# Patient Record
Sex: FEMALE | Race: WHITE | NOT HISPANIC OR LATINO | URBAN - METROPOLITAN AREA
[De-identification: names, ages, dates, MRNs, and addresses within clinical notes are randomized per-mention and may not be internally consistent; named-entity substitution may affect disease eponyms.]

---

## 2018-08-18 ENCOUNTER — INPATIENT (INPATIENT)
Facility: HOSPITAL | Age: 23
LOS: 2 days | Discharge: HOME | DRG: 493 | End: 2018-08-21
Attending: ORTHOPAEDIC SURGERY | Admitting: ORTHOPAEDIC SURGERY
Payer: COMMERCIAL

## 2018-08-18 ENCOUNTER — EMERGENCY (EMERGENCY)
Facility: HOSPITAL | Age: 23
LOS: 1 days | Discharge: LEFT BEFORE DISCHARGE | End: 2018-08-18
Admitting: EMERGENCY MEDICINE

## 2018-08-18 VITALS
DIASTOLIC BLOOD PRESSURE: 76 MMHG | HEART RATE: 100 BPM | SYSTOLIC BLOOD PRESSURE: 119 MMHG | TEMPERATURE: 98 F | OXYGEN SATURATION: 100 %

## 2018-08-18 VITALS
DIASTOLIC BLOOD PRESSURE: 73 MMHG | HEART RATE: 103 BPM | OXYGEN SATURATION: 100 % | SYSTOLIC BLOOD PRESSURE: 108 MMHG | RESPIRATION RATE: 18 BRPM | TEMPERATURE: 98 F

## 2018-08-18 DIAGNOSIS — S42.491D OTHER DISPLACED FRACTURE OF LOWER END OF RIGHT HUMERUS, SUBSEQUENT ENCOUNTER FOR FRACTURE WITH ROUTINE HEALING: ICD-10-CM

## 2018-08-18 LAB
ALBUMIN SERPL ELPH-MCNC: 4.2 G/DL — SIGNIFICANT CHANGE UP (ref 3.3–5)
ALP SERPL-CCNC: 42 U/L — SIGNIFICANT CHANGE UP (ref 40–120)
ALT FLD-CCNC: 8 U/L — LOW (ref 10–45)
ANION GAP SERPL CALC-SCNC: 12 MMOL/L — SIGNIFICANT CHANGE UP (ref 5–17)
APTT BLD: 29 SEC — SIGNIFICANT CHANGE UP (ref 27.5–37.4)
AST SERPL-CCNC: 12 U/L — SIGNIFICANT CHANGE UP (ref 10–40)
BASOPHILS # BLD AUTO: 0.1 K/UL — SIGNIFICANT CHANGE UP (ref 0–0.2)
BASOPHILS NFR BLD AUTO: 0.6 % — SIGNIFICANT CHANGE UP (ref 0–2)
BILIRUB SERPL-MCNC: 0.7 MG/DL — SIGNIFICANT CHANGE UP (ref 0.2–1.2)
BLD GP AB SCN SERPL QL: NEGATIVE — SIGNIFICANT CHANGE UP
BUN SERPL-MCNC: 15 MG/DL — SIGNIFICANT CHANGE UP (ref 7–23)
CALCIUM SERPL-MCNC: 9 MG/DL — SIGNIFICANT CHANGE UP (ref 8.4–10.5)
CHLORIDE SERPL-SCNC: 104 MMOL/L — SIGNIFICANT CHANGE UP (ref 96–108)
CO2 SERPL-SCNC: 25 MMOL/L — SIGNIFICANT CHANGE UP (ref 22–31)
CREAT SERPL-MCNC: 0.68 MG/DL — SIGNIFICANT CHANGE UP (ref 0.5–1.3)
EOSINOPHIL # BLD AUTO: 0.3 K/UL — SIGNIFICANT CHANGE UP (ref 0–0.5)
EOSINOPHIL NFR BLD AUTO: 3.5 % — SIGNIFICANT CHANGE UP (ref 0–6)
GLUCOSE SERPL-MCNC: 105 MG/DL — HIGH (ref 70–99)
HCG SERPL-ACNC: <2 MIU/ML — SIGNIFICANT CHANGE UP
HCT VFR BLD CALC: 32 % — LOW (ref 34.5–45)
HGB BLD-MCNC: 10.8 G/DL — LOW (ref 11.5–15.5)
INR BLD: 1 RATIO — SIGNIFICANT CHANGE UP (ref 0.88–1.16)
LYMPHOCYTES # BLD AUTO: 2.6 K/UL — SIGNIFICANT CHANGE UP (ref 1–3.3)
LYMPHOCYTES # BLD AUTO: 30.5 % — SIGNIFICANT CHANGE UP (ref 13–44)
MCHC RBC-ENTMCNC: 30.4 PG — SIGNIFICANT CHANGE UP (ref 27–34)
MCHC RBC-ENTMCNC: 33.6 GM/DL — SIGNIFICANT CHANGE UP (ref 32–36)
MCV RBC AUTO: 90.5 FL — SIGNIFICANT CHANGE UP (ref 80–100)
MONOCYTES # BLD AUTO: 0.6 K/UL — SIGNIFICANT CHANGE UP (ref 0–0.9)
MONOCYTES NFR BLD AUTO: 7.7 % — SIGNIFICANT CHANGE UP (ref 2–14)
NEUTROPHILS # BLD AUTO: 4.8 K/UL — SIGNIFICANT CHANGE UP (ref 1.8–7.4)
NEUTROPHILS NFR BLD AUTO: 57.7 % — SIGNIFICANT CHANGE UP (ref 43–77)
PLATELET # BLD AUTO: 275 K/UL — SIGNIFICANT CHANGE UP (ref 150–400)
POTASSIUM SERPL-MCNC: 3.9 MMOL/L — SIGNIFICANT CHANGE UP (ref 3.5–5.3)
POTASSIUM SERPL-SCNC: 3.9 MMOL/L — SIGNIFICANT CHANGE UP (ref 3.5–5.3)
PROT SERPL-MCNC: 6.9 G/DL — SIGNIFICANT CHANGE UP (ref 6–8.3)
PROTHROM AB SERPL-ACNC: 10.9 SEC — SIGNIFICANT CHANGE UP (ref 9.8–12.7)
RBC # BLD: 3.54 M/UL — LOW (ref 3.8–5.2)
RBC # FLD: 13 % — SIGNIFICANT CHANGE UP (ref 10.3–14.5)
RH IG SCN BLD-IMP: NEGATIVE — SIGNIFICANT CHANGE UP
RH IG SCN BLD-IMP: NEGATIVE — SIGNIFICANT CHANGE UP
SODIUM SERPL-SCNC: 141 MMOL/L — SIGNIFICANT CHANGE UP (ref 135–145)
WBC # BLD: 8.4 K/UL — SIGNIFICANT CHANGE UP (ref 3.8–10.5)
WBC # FLD AUTO: 8.4 K/UL — SIGNIFICANT CHANGE UP (ref 3.8–10.5)

## 2018-08-18 PROCEDURE — 99284 EMERGENCY DEPT VISIT MOD MDM: CPT

## 2018-08-18 RX ORDER — ACETAMINOPHEN 500 MG
650 TABLET ORAL EVERY 6 HOURS
Qty: 0 | Refills: 0 | Status: DISCONTINUED | OUTPATIENT
Start: 2018-08-18 | End: 2018-08-20

## 2018-08-18 RX ORDER — FENTANYL CITRATE 50 UG/ML
70 INJECTION INTRAVENOUS ONCE
Qty: 0 | Refills: 0 | Status: DISCONTINUED | OUTPATIENT
Start: 2018-08-18 | End: 2018-08-18

## 2018-08-18 RX ORDER — OXYCODONE HYDROCHLORIDE 5 MG/1
5 TABLET ORAL EVERY 4 HOURS
Qty: 0 | Refills: 0 | Status: DISCONTINUED | OUTPATIENT
Start: 2018-08-18 | End: 2018-08-20

## 2018-08-18 RX ORDER — OXYCODONE HYDROCHLORIDE 5 MG/1
10 TABLET ORAL EVERY 4 HOURS
Qty: 0 | Refills: 0 | Status: DISCONTINUED | OUTPATIENT
Start: 2018-08-18 | End: 2018-08-20

## 2018-08-18 RX ORDER — DIPHENHYDRAMINE HCL 50 MG
25 CAPSULE ORAL AT BEDTIME
Qty: 0 | Refills: 0 | Status: DISCONTINUED | OUTPATIENT
Start: 2018-08-18 | End: 2018-08-20

## 2018-08-18 RX ORDER — MORPHINE SULFATE 50 MG/1
2 CAPSULE, EXTENDED RELEASE ORAL EVERY 4 HOURS
Qty: 0 | Refills: 0 | Status: DISCONTINUED | OUTPATIENT
Start: 2018-08-18 | End: 2018-08-20

## 2018-08-18 RX ADMIN — FENTANYL CITRATE 70 MICROGRAM(S): 50 INJECTION INTRAVENOUS at 21:47

## 2018-08-18 NOTE — ED PROVIDER NOTE - CARE PLAN
Principal Discharge DX:	Other closed displaced fracture of distal end of right humerus with routine healing, subsequent encounter  Goal:	right arm displaced, comminuted, angulated  Secondary Diagnosis:	Fracture closed of lower end of forearm, right, sequela

## 2018-08-18 NOTE — H&P ADULT - NSHPPHYSICALEXAM_GEN_ALL_CORE
Gen: NAD, AAOx3  RUE: Skin intact, significant ecchymosis to R medial arm/elbow, mild ecchymosis over distal radius, +ttp distal humerus/distal radius, no bony ttp elsewhere, +AIN/PIN/M/R/U/Msc/Ax nerves intact, SILT, cap refill brisk, warm/well perfused fingers, compartments soft/compressible    Secondary Survey: No TTP over bony prominences, SILT, palpable pulses, full/painless range of motion, compartments soft
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
## 2018-08-18 NOTE — ED PROVIDER NOTE - MEDICAL DECISION MAKING DETAILS
Ludivina Brito MD  22 yr old female with fracture of humerus displaced, angulated, comminuted and distal radial fracture; intact neurovascular on exam, intact pulses, normal capillary refill, swelling around the elbow and upperarm with echymosis; PLan: repeat xrays, ortho consult
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
## 2018-08-18 NOTE — ED ADULT NURSE REASSESSMENT NOTE - NS ED NURSE REASSESS COMMENT FT1
pt given fentanyl intranasally for sedation for changing her splint.  tolerated fine by pt with no change in VS

## 2018-08-18 NOTE — ED ADULT NURSE NOTE - NSIMPLEMENTINTERV_GEN_ALL_ED
Implemented All Universal Safety Interventions:  [place] to call system. Call bell, personal items and telephone within reach. Instruct patient to call for assistance. Room bathroom lighting operational. Non-slip footwear when patient is off stretcher. Physically safe environment: no spills, clutter or unnecessary equipment. Stretcher in lowest position, wheels locked, appropriate side rails in place.

## 2018-08-18 NOTE — ED PROVIDER NOTE - PRINCIPAL DIAGNOSIS
Other closed displaced fracture of distal end of right humerus with routine healing, subsequent encounter

## 2018-08-19 ENCOUNTER — TRANSCRIPTION ENCOUNTER (OUTPATIENT)
Age: 23
End: 2018-08-19

## 2018-08-19 LAB — HCG UR QL: NEGATIVE — SIGNIFICANT CHANGE UP

## 2018-08-19 RX ORDER — HEPARIN SODIUM 5000 [USP'U]/ML
5000 INJECTION INTRAVENOUS; SUBCUTANEOUS EVERY 8 HOURS
Qty: 0 | Refills: 0 | Status: COMPLETED | OUTPATIENT
Start: 2018-08-19 | End: 2018-08-19

## 2018-08-19 RX ORDER — SODIUM CHLORIDE 9 MG/ML
1000 INJECTION, SOLUTION INTRAVENOUS
Qty: 0 | Refills: 0 | Status: DISCONTINUED | OUTPATIENT
Start: 2018-08-19 | End: 2018-08-20

## 2018-08-19 RX ADMIN — Medication 1 TABLET(S): at 12:52

## 2018-08-19 RX ADMIN — Medication 650 MILLIGRAM(S): at 23:00

## 2018-08-19 RX ADMIN — Medication 650 MILLIGRAM(S): at 22:30

## 2018-08-19 RX ADMIN — Medication 650 MILLIGRAM(S): at 08:20

## 2018-08-19 RX ADMIN — HEPARIN SODIUM 5000 UNIT(S): 5000 INJECTION INTRAVENOUS; SUBCUTANEOUS at 15:59

## 2018-08-19 RX ADMIN — HEPARIN SODIUM 5000 UNIT(S): 5000 INJECTION INTRAVENOUS; SUBCUTANEOUS at 22:30
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
## 2018-08-19 NOTE — PROGRESS NOTE ADULT - SUBJECTIVE AND OBJECTIVE BOX
Pt S/E at bedside, no acute events overnight, pain controlled. No complaints this morning.    Vital Signs Last 24 Hrs  T(C): 36.8 (19 Aug 2018 06:02), Max: 36.8 (19 Aug 2018 06:02)  T(F): 98.2 (19 Aug 2018 06:02), Max: 98.2 (19 Aug 2018 06:02)  HR: 70 (19 Aug 2018 06:02) (70 - 120)  BP: 102/65 (19 Aug 2018 06:02) (100/61 - 114/67)  BP(mean): --  RR: 18 (19 Aug 2018 06:02) (16 - 18)  SpO2: 98% (19 Aug 2018 06:02) (98% - 100%)    Gen: NAD, AAOx3    Right Upper Extremity:  +splints, c/d/i  +AIN/PIN/M/R/U/Msc/Ax  SILT C5-T1  +Radial Pulse  Compartments soft  No calf TTP B/L

## 2018-08-20 LAB
ANION GAP SERPL CALC-SCNC: 10 MMOL/L — SIGNIFICANT CHANGE UP (ref 5–17)
APTT BLD: 31 SEC — SIGNIFICANT CHANGE UP (ref 27.5–37.4)
BUN SERPL-MCNC: 12 MG/DL — SIGNIFICANT CHANGE UP (ref 7–23)
CALCIUM SERPL-MCNC: 8.9 MG/DL — SIGNIFICANT CHANGE UP (ref 8.4–10.5)
CHLORIDE SERPL-SCNC: 105 MMOL/L — SIGNIFICANT CHANGE UP (ref 96–108)
CO2 SERPL-SCNC: 24 MMOL/L — SIGNIFICANT CHANGE UP (ref 22–31)
CREAT SERPL-MCNC: 0.66 MG/DL — SIGNIFICANT CHANGE UP (ref 0.5–1.3)
GLUCOSE SERPL-MCNC: 100 MG/DL — HIGH (ref 70–99)
HCT VFR BLD CALC: 26.9 % — LOW (ref 34.5–45)
HGB BLD-MCNC: 9.3 G/DL — LOW (ref 11.5–15.5)
INR BLD: 1.04 RATIO — SIGNIFICANT CHANGE UP (ref 0.88–1.16)
MCHC RBC-ENTMCNC: 31.1 PG — SIGNIFICANT CHANGE UP (ref 27–34)
MCHC RBC-ENTMCNC: 34.5 GM/DL — SIGNIFICANT CHANGE UP (ref 32–36)
MCV RBC AUTO: 90.1 FL — SIGNIFICANT CHANGE UP (ref 80–100)
PLATELET # BLD AUTO: 249 K/UL — SIGNIFICANT CHANGE UP (ref 150–400)
POTASSIUM SERPL-MCNC: 4 MMOL/L — SIGNIFICANT CHANGE UP (ref 3.5–5.3)
POTASSIUM SERPL-SCNC: 4 MMOL/L — SIGNIFICANT CHANGE UP (ref 3.5–5.3)
PROTHROM AB SERPL-ACNC: 11.2 SEC — SIGNIFICANT CHANGE UP (ref 9.8–12.7)
RBC # BLD: 2.99 M/UL — LOW (ref 3.8–5.2)
RBC # FLD: 12.5 % — SIGNIFICANT CHANGE UP (ref 10.3–14.5)
SODIUM SERPL-SCNC: 139 MMOL/L — SIGNIFICANT CHANGE UP (ref 135–145)
WBC # BLD: 8.3 K/UL — SIGNIFICANT CHANGE UP (ref 3.8–10.5)
WBC # FLD AUTO: 8.3 K/UL — SIGNIFICANT CHANGE UP (ref 3.8–10.5)

## 2018-08-20 PROCEDURE — 24545 OPTX HUM FX WO NTRCNDYLR XTN: CPT | Mod: RT

## 2018-08-20 PROCEDURE — 25609 OPTX DST RD XART FX/EP SEP3+: CPT | Mod: RT

## 2018-08-20 PROCEDURE — 64708 REVISE ARM/LEG NERVE: CPT | Mod: 59,RT

## 2018-08-20 RX ORDER — DOCUSATE SODIUM 100 MG
100 CAPSULE ORAL THREE TIMES A DAY
Qty: 0 | Refills: 0 | Status: DISCONTINUED | OUTPATIENT
Start: 2018-08-20 | End: 2018-08-21

## 2018-08-20 RX ORDER — CEFAZOLIN SODIUM 1 G
2000 VIAL (EA) INJECTION EVERY 8 HOURS
Qty: 0 | Refills: 0 | Status: COMPLETED | OUTPATIENT
Start: 2018-08-20 | End: 2018-08-20

## 2018-08-20 RX ORDER — ACETAMINOPHEN 500 MG
650 TABLET ORAL EVERY 6 HOURS
Qty: 0 | Refills: 0 | Status: DISCONTINUED | OUTPATIENT
Start: 2018-08-20 | End: 2018-08-21

## 2018-08-20 RX ORDER — ONDANSETRON 8 MG/1
4 TABLET, FILM COATED ORAL ONCE
Qty: 0 | Refills: 0 | Status: DISCONTINUED | OUTPATIENT
Start: 2018-08-20 | End: 2018-08-20

## 2018-08-20 RX ORDER — SODIUM CHLORIDE 9 MG/ML
1000 INJECTION, SOLUTION INTRAVENOUS
Qty: 0 | Refills: 0 | Status: DISCONTINUED | OUTPATIENT
Start: 2018-08-20 | End: 2018-08-21

## 2018-08-20 RX ORDER — OXYCODONE HYDROCHLORIDE 5 MG/1
10 TABLET ORAL EVERY 4 HOURS
Qty: 0 | Refills: 0 | Status: DISCONTINUED | OUTPATIENT
Start: 2018-08-20 | End: 2018-08-21

## 2018-08-20 RX ORDER — TRAMADOL HYDROCHLORIDE 50 MG/1
50 TABLET ORAL EVERY 6 HOURS
Qty: 0 | Refills: 0 | Status: DISCONTINUED | OUTPATIENT
Start: 2018-08-20 | End: 2018-08-21

## 2018-08-20 RX ORDER — ONDANSETRON 8 MG/1
4 TABLET, FILM COATED ORAL EVERY 6 HOURS
Qty: 0 | Refills: 0 | Status: DISCONTINUED | OUTPATIENT
Start: 2018-08-20 | End: 2018-08-21

## 2018-08-20 RX ORDER — OXYCODONE HYDROCHLORIDE 5 MG/1
5 TABLET ORAL EVERY 4 HOURS
Qty: 0 | Refills: 0 | Status: DISCONTINUED | OUTPATIENT
Start: 2018-08-20 | End: 2018-08-21

## 2018-08-20 RX ORDER — HYDROMORPHONE HYDROCHLORIDE 2 MG/ML
0.5 INJECTION INTRAMUSCULAR; INTRAVENOUS; SUBCUTANEOUS
Qty: 0 | Refills: 0 | Status: DISCONTINUED | OUTPATIENT
Start: 2018-08-20 | End: 2018-08-20

## 2018-08-20 RX ORDER — ASPIRIN/CALCIUM CARB/MAGNESIUM 324 MG
325 TABLET ORAL
Qty: 0 | Refills: 0 | Status: DISCONTINUED | OUTPATIENT
Start: 2018-08-20 | End: 2018-08-21

## 2018-08-20 RX ORDER — MAGNESIUM HYDROXIDE 400 MG/1
30 TABLET, CHEWABLE ORAL DAILY
Qty: 0 | Refills: 0 | Status: DISCONTINUED | OUTPATIENT
Start: 2018-08-20 | End: 2018-08-21

## 2018-08-20 RX ADMIN — Medication 325 MILLIGRAM(S): at 19:06

## 2018-08-20 RX ADMIN — Medication 100 MILLIGRAM(S): at 23:07

## 2018-08-20 RX ADMIN — Medication 650 MILLIGRAM(S): at 23:06

## 2018-08-20 RX ADMIN — Medication 100 MILLIGRAM(S): at 15:47

## 2018-08-20 RX ADMIN — Medication 650 MILLIGRAM(S): at 19:06

## 2018-08-20 RX ADMIN — SODIUM CHLORIDE 100 MILLILITER(S): 9 INJECTION, SOLUTION INTRAVENOUS at 13:47

## 2018-08-20 RX ADMIN — Medication 650 MILLIGRAM(S): at 19:36
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
## 2018-08-20 NOTE — PROGRESS NOTE ADULT - SUBJECTIVE AND OBJECTIVE BOX
ORTHO ATTENDING POST OP    s/p ORIF R distal radius and R distal humerus  Bulky dressing  elevation  keep dressing clean and dry until office visit  sling for comfort	  NWB RUE  ROM as tolerated RUE  f/u 2 weeks  ancef 1 g x 24h   BID for DVT ppx  encourage ROM fingers

## 2018-08-20 NOTE — PROGRESS NOTE ADULT - SUBJECTIVE AND OBJECTIVE BOX
Pt S/E at bedside, no acute events overnight, pain controlled. No complaints this morning. Plan for OR today for ORIF R distal humerus.    Vital Signs Last 24 Hrs  T(C): 36.8 (20 Aug 2018 05:55), Max: 36.8 (19 Aug 2018 16:52)  T(F): 98.2 (20 Aug 2018 05:55), Max: 98.3 (19 Aug 2018 16:52)  HR: 94 (20 Aug 2018 05:55) (83 - 97)  BP: 109/74 (20 Aug 2018 05:55) (90/60 - 109/74)  BP(mean): --  RR: 18 (20 Aug 2018 05:55) (18 - 18)  SpO2: 100% (20 Aug 2018 05:55) (99% - 100%)    Gen: NAD, AAOx3    Right Upper Extremity:  +splint c/d/i  +AIN/PIN/M/R/U/Msc/Ax  SILT all 5 fingers  Cap refill brisk  Compartments soft  No calf TTP B/L

## 2018-08-20 NOTE — BRIEF OPERATIVE NOTE - PROCEDURE
<<-----Click on this checkbox to enter Procedure Open reduction and internal fixation of fracture of right distal radius  08/20/2018    Active  AVJOB  Open reduction and internal fixation (ORIF) of fracture of distal humerus  08/20/2018    Active  AVJOB

## 2018-08-20 NOTE — CHART NOTE - NSCHARTNOTEFT_GEN_A_CORE
No complaints; Denies SOB/CP/N/V.    T(C): 36.4 (08-20-18 @ 14:37)  T(F): 97.5 (08-20-18 @ 14:37)  HR: 112 (08-20-18 @ 14:37)  BP: 93/61 (08-20-18 @ 14:37)  RR: 18 (08-20-18 @ 14:37)  SpO2: 98% (08-20-18 @ 14:37)      Exam:  Gen: A/O; NAD    RUE:   Dressing/Sling CDI  + diffuse ecchymosis and non-pitting edema  No sensation & no motor s/p nerve block    Digits WWP; Cap refill <2 secs       A/P: 22y Female s/p ORIF R distal radius and R distal humerus; Stable  -Pain control; Ice prn; Elevate  -DVT ppx; IS  -Am labs  -PT/OT eval: NWB RUE in sling; ROM as tolerated; encourage ROM digits  -Cont current tx  -DC planning    Khushi Akers PA-C  Orthopedic Surgery  [employer]agers [phone]

## 2018-08-20 NOTE — BRIEF OPERATIVE NOTE - PRE-OP DX
Distal radius fracture, right  08/20/2018    Active  Job, Jose E VERGARA  Humerus fracture  08/20/2018  Right  Active  Job, Jose E VERGARA

## 2018-08-20 NOTE — BRIEF OPERATIVE NOTE - OPERATION/FINDINGS
ORIF of right distal humeral shaft. ORIF of distal radius.  No abnormal findings.  See operative report.

## 2018-08-21 ENCOUNTER — TRANSCRIPTION ENCOUNTER (OUTPATIENT)
Age: 23
End: 2018-08-21

## 2018-08-21 VITALS
TEMPERATURE: 98 F | SYSTOLIC BLOOD PRESSURE: 109 MMHG | RESPIRATION RATE: 17 BRPM | HEART RATE: 88 BPM | DIASTOLIC BLOOD PRESSURE: 72 MMHG | OXYGEN SATURATION: 99 %

## 2018-08-21 LAB
ANION GAP SERPL CALC-SCNC: 10 MMOL/L — SIGNIFICANT CHANGE UP (ref 5–17)
BUN SERPL-MCNC: 8 MG/DL — SIGNIFICANT CHANGE UP (ref 7–23)
CALCIUM SERPL-MCNC: 8.3 MG/DL — LOW (ref 8.4–10.5)
CHLORIDE SERPL-SCNC: 105 MMOL/L — SIGNIFICANT CHANGE UP (ref 96–108)
CO2 SERPL-SCNC: 24 MMOL/L — SIGNIFICANT CHANGE UP (ref 22–31)
CREAT SERPL-MCNC: 0.63 MG/DL — SIGNIFICANT CHANGE UP (ref 0.5–1.3)
GLUCOSE SERPL-MCNC: 109 MG/DL — HIGH (ref 70–99)
HCT VFR BLD CALC: 26.2 % — LOW (ref 34.5–45)
HGB BLD-MCNC: 8.6 G/DL — LOW (ref 11.5–15.5)
MCHC RBC-ENTMCNC: 29.5 PG — SIGNIFICANT CHANGE UP (ref 27–34)
MCHC RBC-ENTMCNC: 32.7 GM/DL — SIGNIFICANT CHANGE UP (ref 32–36)
MCV RBC AUTO: 90.4 FL — SIGNIFICANT CHANGE UP (ref 80–100)
PLATELET # BLD AUTO: 264 K/UL — SIGNIFICANT CHANGE UP (ref 150–400)
POTASSIUM SERPL-MCNC: 3.9 MMOL/L — SIGNIFICANT CHANGE UP (ref 3.5–5.3)
POTASSIUM SERPL-SCNC: 3.9 MMOL/L — SIGNIFICANT CHANGE UP (ref 3.5–5.3)
RBC # BLD: 2.9 M/UL — LOW (ref 3.8–5.2)
RBC # FLD: 12.6 % — SIGNIFICANT CHANGE UP (ref 10.3–14.5)
SODIUM SERPL-SCNC: 139 MMOL/L — SIGNIFICANT CHANGE UP (ref 135–145)
WBC # BLD: 12.8 K/UL — HIGH (ref 3.8–10.5)
WBC # FLD AUTO: 12.8 K/UL — HIGH (ref 3.8–10.5)

## 2018-08-21 PROCEDURE — 73080 X-RAY EXAM OF ELBOW: CPT

## 2018-08-21 PROCEDURE — 85730 THROMBOPLASTIN TIME PARTIAL: CPT

## 2018-08-21 PROCEDURE — 86900 BLOOD TYPING SEROLOGIC ABO: CPT

## 2018-08-21 PROCEDURE — 80048 BASIC METABOLIC PNL TOTAL CA: CPT

## 2018-08-21 PROCEDURE — 86901 BLOOD TYPING SEROLOGIC RH(D): CPT

## 2018-08-21 PROCEDURE — 86850 RBC ANTIBODY SCREEN: CPT

## 2018-08-21 PROCEDURE — 99285 EMERGENCY DEPT VISIT HI MDM: CPT

## 2018-08-21 PROCEDURE — 76000 FLUOROSCOPY <1 HR PHYS/QHP: CPT

## 2018-08-21 PROCEDURE — 85610 PROTHROMBIN TIME: CPT

## 2018-08-21 PROCEDURE — C1713: CPT

## 2018-08-21 PROCEDURE — 73090 X-RAY EXAM OF FOREARM: CPT

## 2018-08-21 PROCEDURE — C1889: CPT

## 2018-08-21 PROCEDURE — 85027 COMPLETE CBC AUTOMATED: CPT

## 2018-08-21 PROCEDURE — 97165 OT EVAL LOW COMPLEX 30 MIN: CPT

## 2018-08-21 PROCEDURE — 80053 COMPREHEN METABOLIC PANEL: CPT

## 2018-08-21 PROCEDURE — 84702 CHORIONIC GONADOTROPIN TEST: CPT

## 2018-08-21 PROCEDURE — 81025 URINE PREGNANCY TEST: CPT

## 2018-08-21 PROCEDURE — 73060 X-RAY EXAM OF HUMERUS: CPT

## 2018-08-21 RX ORDER — ASPIRIN/CALCIUM CARB/MAGNESIUM 324 MG
1 TABLET ORAL
Qty: 14 | Refills: 0 | OUTPATIENT
Start: 2018-08-21 | End: 2018-09-03

## 2018-08-21 RX ADMIN — OXYCODONE HYDROCHLORIDE 5 MILLIGRAM(S): 5 TABLET ORAL at 00:46

## 2018-08-21 RX ADMIN — OXYCODONE HYDROCHLORIDE 5 MILLIGRAM(S): 5 TABLET ORAL at 00:16

## 2018-08-21 RX ADMIN — Medication 325 MILLIGRAM(S): at 05:21

## 2018-08-21 RX ADMIN — Medication 650 MILLIGRAM(S): at 05:21

## 2018-08-21 RX ADMIN — Medication 650 MILLIGRAM(S): at 18:09

## 2018-08-21 RX ADMIN — Medication 650 MILLIGRAM(S): at 10:42

## 2018-08-21 NOTE — DISCHARGE NOTE ADULT - MEDICATION SUMMARY - MEDICATIONS TO TAKE
I will START or STAY ON the medications listed below when I get home from the hospital:    aspirin 325 mg oral tablet  -- 1 tab(s) by mouth once a day for DVT prophylaxis  -- Take with food or milk.    -- Indication: For dvt prophylaxis    oxyCODONE-acetaminophen 5 mg-325 mg oral tablet  -- 1 tab(s) by mouth every 4 hours, As Needed for pain MDD:6  -- Caution federal law prohibits the transfer of this drug to any person other  than the person for whom it was prescribed.  May cause drowsiness.  Alcohol may intensify this effect.  Use care when operating dangerous machinery.  This prescription cannot be refilled.  This product contains acetaminophen.  Do not use  with any other product containing acetaminophen to prevent possible liver damage.  Using more of this medication than prescribed may cause serious breathing problems.    -- Indication: For pain
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
## 2018-08-21 NOTE — DISCHARGE NOTE ADULT - CARE PROVIDER_API CALL
Carrington Curtis (MD), Orthopaedic Surgery  [address] [place] [address]  Phone: [phone]  Fax: [phone]

## 2018-08-21 NOTE — DISCHARGE NOTE ADULT - PATIENT PORTAL LINK FT
You can access the [facility] Patient Portal, offered by [facility], by registering with the following website: [email or web address]

## 2018-08-21 NOTE — PHYSICAL THERAPY INITIAL EVALUATION ADULT - ACTIVE RANGE OF MOTION EXAMINATION, REHAB EVAL
R elbow/wrist n/t d/t pain/Left LE Active ROM was WFL (within functional limits)/bilateral  lower extremity Active ROM was WFL (within functional limits)
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
## 2018-08-21 NOTE — PROGRESS NOTE ADULT - SUBJECTIVE AND OBJECTIVE BOX
Pt S/E at bedside, no acute events overnight, pain controlled. No complaints this morning. Would like to go home today.    Vital Signs Last 24 Hrs  T(C): 36.9 (21 Aug 2018 05:09), Max: 37.1 (20 Aug 2018 17:37)  T(F): 98.5 (21 Aug 2018 05:09), Max: 98.8 (21 Aug 2018 00:37)  HR: 75 (21 Aug 2018 05:09) (55 - 112)  BP: 107/67 (21 Aug 2018 05:09) (93/58 - 107/69)  BP(mean): 67 (20 Aug 2018 13:45) (67 - 75)  RR: 18 (21 Aug 2018 05:09) (8 - 18)  SpO2: 94% (21 Aug 2018 05:09) (94% - 100%)    Gen: NAD, AAOx3    Right Upper Extremity:  Dressing clean dry intact, +swelling of R hand  +AIN/PIN/M/R/U/Msc/Ax  SILT C5-T1, still slightly diminished 2/2 block  +Radial Pulse  Compartments soft  No calf TTP B/L

## 2018-08-21 NOTE — OCCUPATIONAL THERAPY INITIAL EVALUATION ADULT - ANTICIPATED DISCHARGE DISPOSITION, OT EVAL
Home with outpatient OT when cleared by MD. Home with supervision/assist for all functional activities and ADLs as needed

## 2018-08-21 NOTE — DISCHARGE NOTE ADULT - HOSPITAL COURSE
The patient is a 22y year old Female status post Open Reduction Surgical Fixation of a right distal humerus and distal radius after being admitted through [facility] ED. The patient was medically optimized for the previously mentioned surgical procedure. The patient was taken to the operating room on date mentioned above. Prophylactic antibiotics were started before the procedure and continued for 24 hours. There were no complications during the procedure and patient tolerated the procedure well. The patient was transferred to recovery room in stable condition and subsequently to surgical floor. Patient was placed on aspirin 325mg daily for anticoagulation. All home medications were continued. The patient received physical therapy while in the hospital. The dressing was kept clean, dry and intact. The rest of the hospital stay was unremarkable. The patient was discharged in stable condition to follow up as outpatient.

## 2018-08-21 NOTE — OCCUPATIONAL THERAPY INITIAL EVALUATION ADULT - RANGE OF MOTION, PT EVAL
GOAL: Pt's AROM of RUE WFL in 4 weeks to increase ability to participate in functional mobility and ADLs.

## 2018-08-21 NOTE — OCCUPATIONAL THERAPY INITIAL EVALUATION ADULT - RANGE OF MOTION EXAMINATION, UPPER EXTREMITY
R digits limited by swelling and pain, R shoulder/elbow/wrist NT awaiting clarification of orders/Left UE Active ROM was WFL (within functional limits)

## 2018-08-21 NOTE — DISCHARGE NOTE ADULT - PLAN OF CARE
Return to baseline ADLs 1. Pain Control  2. Non-Weight Bearing Right Upper Extremity in sling  3. DVT Prophylaxis for 14 days with aspirin 325mg daily  4. Physical Therapy  5. Follow up with Dr. Curtis as outpatient in 10-14 days after discharge from the hospital or rehab. Call office for appointment.  6. Staples/sutures to be removed Post-Op Day 14, and repeat x-rays as needed.  7. Ice/Elevate affected area as needed.  8. Keep dressing clean and dry 1. Pain Control  2. Non-Weight Bearing Right Upper Extremity in sling  3. DVT Prophylaxis for 14 days with aspirin 325mg daily  4. Physical Therapy  5. Follow up with Dr. Curtis as outpatient in 10-14 days after discharge from the hospital or rehab. Call office for appointment.  6. Staples/sutures to be removed Post-Op Day 14, and repeat x-rays as needed.  7. Ice/Elevate affected area as needed.  8. Keep dressing clean and dry    Take Percocet for severe pain only. Can take up to 8 Tylenol 325 mg a day while taking Percocet. Alternate between taking Ibuprofen and Tylenol so you are taking pain medication every 3-4 hours if your pain is severe.  Narcotic pain medicine can cause extreme nausea and constipation. Drink plenty of water and take diuretics (colace, Miralax) as needed. You can get them from your local pharmacy.  It is important to ice and elevate your arm to keep swelling down and the pain manageable. Keep the ice on for 20 minutes, and then keep off for 20 minutes. Repeat while awake.

## 2018-08-21 NOTE — DISCHARGE NOTE ADULT - CARE PLAN
Principal Discharge DX:	Other closed displaced fracture of distal end of right humerus with routine healing, subsequent encounter  Goal:	Return to baseline ADLs  Assessment and plan of treatment:	1. Pain Control  2. Non-Weight Bearing Right Upper Extremity in sling  3. DVT Prophylaxis for 14 days with aspirin 325mg daily  4. Physical Therapy  5. Follow up with Dr. Curtis as outpatient in 10-14 days after discharge from the hospital or rehab. Call office for appointment.  6. Staples/sutures to be removed Post-Op Day 14, and repeat x-rays as needed.  7. Ice/Elevate affected area as needed.  8. Keep dressing clean and dry Principal Discharge DX:	Other closed displaced fracture of distal end of right humerus with routine healing, subsequent encounter  Goal:	Return to baseline ADLs  Assessment and plan of treatment:	1. Pain Control  2. Non-Weight Bearing Right Upper Extremity in sling  3. DVT Prophylaxis for 14 days with aspirin 325mg daily  4. Physical Therapy  5. Follow up with Dr. Curtis as outpatient in 10-14 days after discharge from the hospital or rehab. Call office for appointment.  6. Staples/sutures to be removed Post-Op Day 14, and repeat x-rays as needed.  7. Ice/Elevate affected area as needed.  8. Keep dressing clean and dry    Take Percocet for severe pain only. Can take up to 8 Tylenol 325 mg a day while taking Percocet. Alternate between taking Ibuprofen and Tylenol so you are taking pain medication every 3-4 hours if your pain is severe.  Narcotic pain medicine can cause extreme nausea and constipation. Drink plenty of water and take diuretics (colace, Miralax) as needed. You can get them from your local pharmacy.  It is important to ice and elevate your arm to keep swelling down and the pain manageable. Keep the ice on for 20 minutes, and then keep off for 20 minutes. Repeat while awake.

## 2018-08-21 NOTE — OCCUPATIONAL THERAPY INITIAL EVALUATION ADULT - LIVES WITH, PROFILE
as per pt, resides in a PH with parents, 5 stairs to enter with railings, one flight up with railings, PTA, pt (I) with amb, (I) with ADLs.

## 2018-09-05 ENCOUNTER — APPOINTMENT (OUTPATIENT)
Dept: ORTHOPEDIC SURGERY | Facility: CLINIC | Age: 23
End: 2018-09-05
Payer: COMMERCIAL

## 2018-09-05 VITALS — DIASTOLIC BLOOD PRESSURE: 72 MMHG | HEART RATE: 91 BPM | SYSTOLIC BLOOD PRESSURE: 117 MMHG

## 2018-09-05 VITALS — HEIGHT: 66 IN | WEIGHT: 110 LBS | BODY MASS INDEX: 17.68 KG/M2

## 2018-09-05 DIAGNOSIS — Z78.9 OTHER SPECIFIED HEALTH STATUS: ICD-10-CM

## 2018-09-05 PROCEDURE — 99024 POSTOP FOLLOW-UP VISIT: CPT

## 2018-09-18 ENCOUNTER — APPOINTMENT (OUTPATIENT)
Dept: ORTHOPEDIC SURGERY | Facility: CLINIC | Age: 23
End: 2018-09-18
Payer: COMMERCIAL

## 2018-09-18 VITALS
HEART RATE: 80 BPM | BODY MASS INDEX: 18 KG/M2 | HEIGHT: 66 IN | DIASTOLIC BLOOD PRESSURE: 70 MMHG | SYSTOLIC BLOOD PRESSURE: 105 MMHG | WEIGHT: 112 LBS

## 2018-09-18 PROCEDURE — 99024 POSTOP FOLLOW-UP VISIT: CPT

## 2018-09-18 PROCEDURE — 73080 X-RAY EXAM OF ELBOW: CPT | Mod: RT

## 2018-09-18 PROCEDURE — 73110 X-RAY EXAM OF WRIST: CPT | Mod: RT

## 2018-10-23 ENCOUNTER — APPOINTMENT (OUTPATIENT)
Dept: ORTHOPEDIC SURGERY | Facility: CLINIC | Age: 23
End: 2018-10-23
Payer: COMMERCIAL

## 2018-10-23 VITALS
DIASTOLIC BLOOD PRESSURE: 75 MMHG | WEIGHT: 115 LBS | HEIGHT: 66 IN | BODY MASS INDEX: 18.48 KG/M2 | HEART RATE: 90 BPM | SYSTOLIC BLOOD PRESSURE: 117 MMHG

## 2018-10-23 PROCEDURE — 73110 X-RAY EXAM OF WRIST: CPT | Mod: RT

## 2018-10-23 PROCEDURE — 73080 X-RAY EXAM OF ELBOW: CPT | Mod: RT

## 2018-10-23 PROCEDURE — 99024 POSTOP FOLLOW-UP VISIT: CPT

## 2018-11-21 ENCOUNTER — APPOINTMENT (OUTPATIENT)
Dept: ORTHOPEDIC SURGERY | Facility: CLINIC | Age: 23
End: 2018-11-21
Payer: COMMERCIAL

## 2018-11-21 VITALS
DIASTOLIC BLOOD PRESSURE: 64 MMHG | SYSTOLIC BLOOD PRESSURE: 102 MMHG | WEIGHT: 117 LBS | BODY MASS INDEX: 18.8 KG/M2 | HEART RATE: 87 BPM | HEIGHT: 66 IN

## 2018-11-21 PROCEDURE — 73110 X-RAY EXAM OF WRIST: CPT | Mod: RT

## 2018-11-21 PROCEDURE — 99213 OFFICE O/P EST LOW 20 MIN: CPT

## 2018-11-21 PROCEDURE — 73060 X-RAY EXAM OF HUMERUS: CPT | Mod: RT

## 2019-01-15 ENCOUNTER — TRANSCRIPTION ENCOUNTER (OUTPATIENT)
Age: 24
End: 2019-01-15

## 2019-02-20 ENCOUNTER — APPOINTMENT (OUTPATIENT)
Dept: ORTHOPEDIC SURGERY | Facility: CLINIC | Age: 24
End: 2019-02-20

## 2019-03-19 ENCOUNTER — APPOINTMENT (OUTPATIENT)
Dept: ORTHOPEDIC SURGERY | Facility: CLINIC | Age: 24
End: 2019-03-19
Payer: COMMERCIAL

## 2019-03-19 DIAGNOSIS — S42.491D OTHER DISPLACED FRACTURE OF LOWER END OF RIGHT HUMERUS, SUBSEQUENT ENCOUNTER FOR FRACTURE WITH ROUTINE HEALING: ICD-10-CM

## 2019-03-19 DIAGNOSIS — S52.571D OTHER INTRAARTICULAR FRACTURE OF LOWER END OF RIGHT RADIUS, SUBSEQUENT ENCOUNTER FOR CLOSED FRACTURE WITH ROUTINE HEALING: ICD-10-CM

## 2019-03-19 PROCEDURE — 73080 X-RAY EXAM OF ELBOW: CPT | Mod: RT

## 2019-03-19 PROCEDURE — 73110 X-RAY EXAM OF WRIST: CPT | Mod: RT

## 2019-03-19 PROCEDURE — 99213 OFFICE O/P EST LOW 20 MIN: CPT
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
## 2020-05-20 NOTE — PROGRESS NOTE ADULT - ASSESSMENT
Prior authorization submitted to cover my meds for [facility] 05/20/20.    Key: [id number]    22F s/p ORIF R distal humerus/distal radius POD 1  Analgesia  DVT ppx  NWB RUE in sling, ROM as tolerated  Encourage ROM fingers  PT/OT  DC planning to home likely today

## 2020-11-24 ENCOUNTER — APPOINTMENT (OUTPATIENT)
Dept: FAMILY MEDICINE | Facility: CLINIC | Age: 25
End: 2020-11-24
Payer: COMMERCIAL

## 2020-11-24 VITALS
OXYGEN SATURATION: 100 % | HEIGHT: 66 IN | DIASTOLIC BLOOD PRESSURE: 70 MMHG | WEIGHT: 110 LBS | SYSTOLIC BLOOD PRESSURE: 102 MMHG | BODY MASS INDEX: 17.68 KG/M2 | HEART RATE: 95 BPM | TEMPERATURE: 98.2 F

## 2020-11-24 DIAGNOSIS — Z11.59 ENCOUNTER FOR SCREENING FOR OTHER VIRAL DISEASES: ICD-10-CM

## 2020-11-24 PROCEDURE — 99385 PREV VISIT NEW AGE 18-39: CPT | Mod: 25

## 2020-11-24 PROCEDURE — 36415 COLL VENOUS BLD VENIPUNCTURE: CPT

## 2020-11-24 RX ORDER — NITROFURANTOIN (MONOHYDRATE/MACROCRYSTALS) 25; 75 MG/1; MG/1
100 CAPSULE ORAL
Qty: 10 | Refills: 0 | Status: COMPLETED | COMMUNITY
Start: 2018-08-13 | End: 2020-11-24

## 2020-11-24 RX ORDER — HYDROCODONE BITARTRATE AND ACETAMINOPHEN 5; 325 MG/1; MG/1
5-325 TABLET ORAL
Qty: 12 | Refills: 0 | Status: COMPLETED | COMMUNITY
Start: 2018-08-15 | End: 2020-11-24

## 2020-11-24 RX ORDER — IBUPROFEN 400 MG/1
400 TABLET, FILM COATED ORAL
Qty: 30 | Refills: 0 | Status: COMPLETED | COMMUNITY
Start: 2018-08-15 | End: 2020-11-24

## 2020-11-24 RX ORDER — ASPIRIN 325 MG/1
325 TABLET, FILM COATED ORAL
Qty: 14 | Refills: 0 | Status: COMPLETED | COMMUNITY
Start: 2018-08-21 | End: 2020-11-24

## 2020-11-24 NOTE — HEALTH RISK ASSESSMENT
[] : No [Yes] : Yes [Monthly or less (1 pt)] : Monthly or less (1 point) [1 or 2 (0 pts)] : 1 or 2 (0 points) [Less than monthly (1 pt)] : Less than monthly (1 point) [No] : In the past 12 months have you used drugs other than those required for medical reasons? No [Audit-CScore] : 2 [[id number]] : 3 [Patient reported PAP Smear was normal] : Patient reported PAP Smear was normal [HIV test declined] : HIV test declined [Hepatitis C test declined] : Hepatitis C test declined [Sexually Active] : sexually active [Fully functional (bathing, dressing, toileting, transferring, walking, feeding)] : Fully functional (bathing, dressing, toileting, transferring, walking, feeding) [Fully functional (using the telephone, shopping, preparing meals, housekeeping, doing laundry, using] : Fully functional and needs no help or supervision to perform IADLs (using the telephone, shopping, preparing meals, housekeeping, doing laundry, using transportation, managing medications and managing finances) [PapSmearDate] : 2019

## 2020-11-24 NOTE — HISTORY OF PRESENT ILLNESS
[de-identified] : 24 yo F with no significant PMH presents for annual. She is underweight-- trying to gain weight. She tried taking protein shakes but gets upset stomach. \par \par Diet - regular\par exercise-- active but does not exercise

## 2020-11-30 LAB
25(OH)D3 SERPL-MCNC: 42.3 NG/ML
ALBUMIN SERPL ELPH-MCNC: 5 G/DL
ALP BLD-CCNC: 45 U/L
ALT SERPL-CCNC: 5 U/L
ANION GAP SERPL CALC-SCNC: 11 MMOL/L
AST SERPL-CCNC: 11 U/L
BASOPHILS # BLD AUTO: 0.02 K/UL
BASOPHILS NFR BLD AUTO: 0.2 %
BILIRUB SERPL-MCNC: 0.2 MG/DL
BUN SERPL-MCNC: 14 MG/DL
CALCIUM SERPL-MCNC: 9.7 MG/DL
CHLORIDE SERPL-SCNC: 104 MMOL/L
CHOLEST SERPL-MCNC: 147 MG/DL
CO2 SERPL-SCNC: 23 MMOL/L
CREAT SERPL-MCNC: 0.84 MG/DL
EOSINOPHIL # BLD AUTO: 0.09 K/UL
EOSINOPHIL NFR BLD AUTO: 0.8 %
ESTIMATED AVERAGE GLUCOSE: 103 MG/DL
GLUCOSE SERPL-MCNC: 92 MG/DL
HBA1C MFR BLD HPLC: 5.2 %
HCT VFR BLD CALC: 40 %
HDLC SERPL-MCNC: 59 MG/DL
HGB BLD-MCNC: 13.4 G/DL
IMM GRANULOCYTES NFR BLD AUTO: 0.3 %
LDLC SERPL CALC-MCNC: 76 MG/DL
LYMPHOCYTES # BLD AUTO: 3.83 K/UL
LYMPHOCYTES NFR BLD AUTO: 34.6 %
MAN DIFF?: NORMAL
MCHC RBC-ENTMCNC: 30.3 PG
MCHC RBC-ENTMCNC: 33.5 GM/DL
MCV RBC AUTO: 90.5 FL
MONOCYTES # BLD AUTO: 0.97 K/UL
MONOCYTES NFR BLD AUTO: 8.8 %
NEUTROPHILS # BLD AUTO: 6.12 K/UL
NEUTROPHILS NFR BLD AUTO: 55.3 %
NONHDLC SERPL-MCNC: 88 MG/DL
PLATELET # BLD AUTO: 261 K/UL
POTASSIUM SERPL-SCNC: 4 MMOL/L
PROT SERPL-MCNC: 6.8 G/DL
RBC # BLD: 4.42 M/UL
RBC # FLD: 13.2 %
SARS-COV-2 IGG SERPL IA-ACNC: 0.1 INDEX
SARS-COV-2 IGG SERPL QL IA: NEGATIVE
SODIUM SERPL-SCNC: 138 MMOL/L
TRIGL SERPL-MCNC: 64 MG/DL
TSH SERPL-ACNC: 2.22 UIU/ML
VIT B12 SERPL-MCNC: 658 PG/ML
WBC # FLD AUTO: 11.06 K/UL

## 2022-04-11 PROBLEM — Z11.59 SCREENING FOR VIRAL DISEASE: Status: ACTIVE | Noted: 2020-11-24

## 2022-06-16 ENCOUNTER — NON-APPOINTMENT (OUTPATIENT)
Age: 27
End: 2022-06-16

## 2022-08-16 ENCOUNTER — APPOINTMENT (OUTPATIENT)
Dept: FAMILY MEDICINE | Facility: CLINIC | Age: 27
End: 2022-08-16

## 2022-08-16 ENCOUNTER — TRANSCRIPTION ENCOUNTER (OUTPATIENT)
Age: 27
End: 2022-08-16

## 2022-08-16 VITALS
DIASTOLIC BLOOD PRESSURE: 73 MMHG | WEIGHT: 112 LBS | TEMPERATURE: 98.1 F | BODY MASS INDEX: 18 KG/M2 | SYSTOLIC BLOOD PRESSURE: 113 MMHG | HEIGHT: 66 IN | OXYGEN SATURATION: 100 % | HEART RATE: 80 BPM

## 2022-08-16 DIAGNOSIS — Z11.3 ENCOUNTER FOR SCREENING FOR INFECTIONS WITH A PREDOMINANTLY SEXUAL MODE OF TRANSMISSION: ICD-10-CM

## 2022-08-16 PROCEDURE — 99395 PREV VISIT EST AGE 18-39: CPT

## 2022-08-16 NOTE — HISTORY OF PRESENT ILLNESS
[FreeTextEntry1] : annual [de-identified] : 25 yo F with no significant PMH presents for annual. No concerns at this time.\par \par sexually active, using protection. Seeing GYN later today. \par \par She will find out when her last Tdap was.

## 2022-08-17 ENCOUNTER — NON-APPOINTMENT (OUTPATIENT)
Age: 27
End: 2022-08-17

## 2022-08-17 LAB
25(OH)D3 SERPL-MCNC: 30.2 NG/ML
ABO + RH PNL BLD: NORMAL
ALBUMIN SERPL ELPH-MCNC: 4.8 G/DL
ALP BLD-CCNC: 49 U/L
ALT SERPL-CCNC: 10 U/L
ANION GAP SERPL CALC-SCNC: 11 MMOL/L
AST SERPL-CCNC: 14 U/L
BASOPHILS # BLD AUTO: 0.02 K/UL
BASOPHILS NFR BLD AUTO: 0.3 %
BILIRUB SERPL-MCNC: 0.3 MG/DL
BUN SERPL-MCNC: 11 MG/DL
C TRACH RRNA SPEC QL NAA+PROBE: NOT DETECTED
CALCIUM SERPL-MCNC: 9.6 MG/DL
CHLORIDE SERPL-SCNC: 106 MMOL/L
CHOLEST SERPL-MCNC: 163 MG/DL
CO2 SERPL-SCNC: 24 MMOL/L
CREAT SERPL-MCNC: 0.67 MG/DL
EGFR: 124 ML/MIN/1.73M2
EOSINOPHIL # BLD AUTO: 0.05 K/UL
EOSINOPHIL NFR BLD AUTO: 0.8 %
ESTIMATED AVERAGE GLUCOSE: 103 MG/DL
GLUCOSE SERPL-MCNC: 84 MG/DL
HAV IGM SER QL: NONREACTIVE
HBA1C MFR BLD HPLC: 5.2 %
HBV CORE IGM SER QL: NONREACTIVE
HBV SURFACE AG SER QL: NONREACTIVE
HCT VFR BLD CALC: 43.5 %
HCV AB SER QL: NONREACTIVE
HCV S/CO RATIO: 0.24 S/CO
HDLC SERPL-MCNC: 62 MG/DL
HGB BLD-MCNC: 13.5 G/DL
HIV1+2 AB SPEC QL IA.RAPID: NONREACTIVE
IMM GRANULOCYTES NFR BLD AUTO: 0.2 %
LDLC SERPL CALC-MCNC: 93 MG/DL
LYMPHOCYTES # BLD AUTO: 2.56 K/UL
LYMPHOCYTES NFR BLD AUTO: 42.2 %
MAN DIFF?: NORMAL
MCHC RBC-ENTMCNC: 29 PG
MCHC RBC-ENTMCNC: 31 GM/DL
MCV RBC AUTO: 93.5 FL
MONOCYTES # BLD AUTO: 0.57 K/UL
MONOCYTES NFR BLD AUTO: 9.4 %
N GONORRHOEA RRNA SPEC QL NAA+PROBE: NOT DETECTED
NEUTROPHILS # BLD AUTO: 2.85 K/UL
NEUTROPHILS NFR BLD AUTO: 47.1 %
NONHDLC SERPL-MCNC: 101 MG/DL
PLATELET # BLD AUTO: 253 K/UL
POTASSIUM SERPL-SCNC: 4.5 MMOL/L
PROT SERPL-MCNC: 7.1 G/DL
RBC # BLD: 4.65 M/UL
RBC # FLD: 14.4 %
SODIUM SERPL-SCNC: 141 MMOL/L
SOURCE AMPLIFICATION: NORMAL
T PALLIDUM AB SER QL IA: NEGATIVE
TRIGL SERPL-MCNC: 40 MG/DL
TSH SERPL-ACNC: 1.33 UIU/ML
VIT B12 SERPL-MCNC: 728 PG/ML
WBC # FLD AUTO: 6.06 K/UL

## 2022-10-05 ENCOUNTER — NON-APPOINTMENT (OUTPATIENT)
Age: 27
End: 2022-10-05
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
## 2023-10-09 ENCOUNTER — APPOINTMENT (OUTPATIENT)
Dept: INTERNAL MEDICINE | Facility: CLINIC | Age: 28
End: 2023-10-09
Payer: COMMERCIAL

## 2023-10-09 VITALS
WEIGHT: 114 LBS | BODY MASS INDEX: 18.32 KG/M2 | DIASTOLIC BLOOD PRESSURE: 84 MMHG | HEIGHT: 66 IN | OXYGEN SATURATION: 100 % | SYSTOLIC BLOOD PRESSURE: 134 MMHG | HEART RATE: 103 BPM

## 2023-10-09 VITALS — SYSTOLIC BLOOD PRESSURE: 110 MMHG | HEART RATE: 80 BPM | DIASTOLIC BLOOD PRESSURE: 70 MMHG

## 2023-10-09 DIAGNOSIS — E55.9 VITAMIN D DEFICIENCY, UNSPECIFIED: ICD-10-CM

## 2023-10-09 DIAGNOSIS — Z23 ENCOUNTER FOR IMMUNIZATION: ICD-10-CM

## 2023-10-09 DIAGNOSIS — Z01.84 ENCOUNTER FOR ANTIBODY RESPONSE EXAMINATION: ICD-10-CM

## 2023-10-09 DIAGNOSIS — Z11.1 ENCOUNTER FOR SCREENING FOR RESPIRATORY TUBERCULOSIS: ICD-10-CM

## 2023-10-09 DIAGNOSIS — Z00.00 ENCOUNTER FOR GENERAL ADULT MEDICAL EXAMINATION W/OUT ABNORMAL FINDINGS: ICD-10-CM

## 2023-10-09 PROCEDURE — 99395 PREV VISIT EST AGE 18-39: CPT | Mod: 25

## 2023-10-09 PROCEDURE — 90715 TDAP VACCINE 7 YRS/> IM: CPT

## 2023-10-09 PROCEDURE — 90686 IIV4 VACC NO PRSV 0.5 ML IM: CPT

## 2023-10-09 PROCEDURE — 90472 IMMUNIZATION ADMIN EACH ADD: CPT

## 2023-10-09 PROCEDURE — G0008: CPT

## 2023-10-09 RX ORDER — AMOXICILLIN AND CLAVULANATE POTASSIUM 875; 125 MG/1; MG/1
875-125 TABLET, COATED ORAL
Qty: 20 | Refills: 0 | Status: COMPLETED | COMMUNITY
Start: 2023-09-23

## 2023-10-11 ENCOUNTER — TRANSCRIPTION ENCOUNTER (OUTPATIENT)
Age: 28
End: 2023-10-11

## 2023-10-11 LAB
25(OH)D3 SERPL-MCNC: 30.5 NG/ML
ALBUMIN SERPL ELPH-MCNC: 5.3 G/DL
ALP BLD-CCNC: 46 U/L
ALT SERPL-CCNC: 9 U/L
ANION GAP SERPL CALC-SCNC: 15 MMOL/L
AST SERPL-CCNC: 14 U/L
BASOPHILS # BLD AUTO: 0.03 K/UL
BASOPHILS NFR BLD AUTO: 0.3 %
BILIRUB SERPL-MCNC: 0.4 MG/DL
BUN SERPL-MCNC: 8 MG/DL
CALCIUM SERPL-MCNC: 10.2 MG/DL
CHLORIDE SERPL-SCNC: 103 MMOL/L
CHOLEST SERPL-MCNC: 179 MG/DL
CO2 SERPL-SCNC: 22 MMOL/L
CREAT SERPL-MCNC: 0.63 MG/DL
EGFR: 125 ML/MIN/1.73M2
EOSINOPHIL # BLD AUTO: 0.07 K/UL
EOSINOPHIL NFR BLD AUTO: 0.6 %
ESTIMATED AVERAGE GLUCOSE: 100 MG/DL
GLUCOSE SERPL-MCNC: 85 MG/DL
HBA1C MFR BLD HPLC: 5.1 %
HBV SURFACE AB SERPL IA-ACNC: <3 MIU/ML
HCT VFR BLD CALC: 42.9 %
HDLC SERPL-MCNC: 61 MG/DL
HGB BLD-MCNC: 13.9 G/DL
IMM GRANULOCYTES NFR BLD AUTO: 0.4 %
LDLC SERPL CALC-MCNC: 104 MG/DL
LYMPHOCYTES # BLD AUTO: 3.51 K/UL
LYMPHOCYTES NFR BLD AUTO: 31.4 %
M TB IFN-G BLD-IMP: NEGATIVE
MAN DIFF?: NORMAL
MCHC RBC-ENTMCNC: 29.8 PG
MCHC RBC-ENTMCNC: 32.4 GM/DL
MCV RBC AUTO: 92.1 FL
MEV IGG FLD QL IA: 93.9 AU/ML
MEV IGG+IGM SER-IMP: POSITIVE
MONOCYTES # BLD AUTO: 0.96 K/UL
MONOCYTES NFR BLD AUTO: 8.6 %
MUV AB SER-ACNC: POSITIVE
MUV IGG SER QL IA: >300 AU/ML
NEUTROPHILS # BLD AUTO: 6.58 K/UL
NEUTROPHILS NFR BLD AUTO: 58.7 %
NONHDLC SERPL-MCNC: 118 MG/DL
PLATELET # BLD AUTO: 337 K/UL
POTASSIUM SERPL-SCNC: 3.6 MMOL/L
PROT SERPL-MCNC: 8 G/DL
QUANTIFERON TB PLUS MITOGEN MINUS NIL: 9.72 IU/ML
QUANTIFERON TB PLUS NIL: 0.08 IU/ML
QUANTIFERON TB PLUS TB1 MINUS NIL: 0.12 IU/ML
QUANTIFERON TB PLUS TB2 MINUS NIL: 0.08 IU/ML
RBC # BLD: 4.66 M/UL
RBC # FLD: 13.9 %
RUBV IGG FLD-ACNC: 5 INDEX
RUBV IGG SER-IMP: POSITIVE
SODIUM SERPL-SCNC: 139 MMOL/L
TRIGL SERPL-MCNC: 72 MG/DL
TSH SERPL-ACNC: 2.19 UIU/ML
VIT B12 SERPL-MCNC: 819 PG/ML
VZV AB TITR SER: POSITIVE
VZV IGG SER IF-ACNC: 1340 INDEX
WBC # FLD AUTO: 11.19 K/UL

## 2023-10-13 ENCOUNTER — TRANSCRIPTION ENCOUNTER (OUTPATIENT)
Age: 28
End: 2023-10-13

## 2023-12-11 NOTE — PATIENT PROFILE ADULT. - FUNCTIONAL SCREEN CURRENT LEVEL: TOILETING, MLM
Subjective   Patient ID: Yokasta Wasserman is a 17 y.o. female who presents for nurse visit due to not being sure if she needs a vaccine.    Patient has a complaint of dysmenorrhea.    Complains of chest pain when lying down. Occurs when walking home and carrying bookbag. She could be lying down and also occasionally gets a random pain on the left side of her chest.     HPI    Review of Systems   Constitutional: Negative.  Negative for activity change, appetite change, fatigue and fever.   HENT:  Positive for nosebleeds. Negative for congestion, hearing loss, mouth sores, sinus pain and sore throat.    Eyes:  Negative for pain, itching and visual disturbance.   Respiratory:  Positive for chest tightness (see HPI). Negative for cough and wheezing.    Cardiovascular:  Negative for chest pain and palpitations.   Gastrointestinal:  Positive for constipation (occasional;). Negative for abdominal pain, diarrhea, nausea and vomiting.   Endocrine: Negative for cold intolerance and heat intolerance.   Genitourinary:  Negative for difficulty urinating, dysuria, hematuria, vaginal discharge and vaginal pain.   Musculoskeletal:  Negative for arthralgias, back pain, myalgias and neck stiffness.   Skin:  Negative for rash.   Allergic/Immunologic: Negative for environmental allergies and food allergies.   Neurological:  Negative for seizures, syncope, weakness and headaches.   Psychiatric/Behavioral:  Negative for behavioral problems, dysphoric mood, hallucinations, self-injury and suicidal ideas. The patient is nervous/anxious.      Nervousness does not interfere with her life; takes just a few minutes to fall asleep at night.    Objective   Physical Exam  Constitutional:       Appearance: Normal appearance.   HENT:      Nose: Congestion present.   Cardiovascular:      Rate and Rhythm: Normal rate and regular rhythm.   Pulmonary:      Effort: Pulmonary effort is normal.      Breath sounds: Normal breath sounds.   Chest:      Comments:  +reproducible chest pain on palpation consistent with the pain she describes for the chest pain. Costochondritis.        Assessment/Plan   Problem List Items Addressed This Visit    None  Visit Diagnoses       Dysmenorrhea    -  Primary    Costochondritis        Precordial catch syndrome               Patient Instructions   Great to meet you today!  You have dysmenorrhea - learn more at [email or web address] or [email or web address]. These sites have suggestions for treatment.  For now:  Please eat three meals per day, sleep 8-9 hours each night, and exercise 30 minutes each day.   At the first hint of menstrual cramps, please take ibuprofen or naproxen as instructed WITH FOOD. Use as directed. It is important to take the medication before the pain worsens so it will work better.  Please call for follow up if this does not help with the discomfort. We have further strategies that can help with your pain.     You also have reproducible chest pain consistent with costochondritis. Use ibuprofen 600 mg every 8 hours as needed for discomfort - always take with food. The inflammation should get better using this treatment.     Follow up in a couple of months. We have other options for dysmenorrhea - some of which we discussed today. If the pain continues, please come back to see me!          Jovana Leos MD 12/11/23 3:18 PM     I spent 45 minutes with the patient counseling and completing charting. Also completed other patient history elements for the chart.    (0) independent
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
## 2024-06-06 NOTE — OCCUPATIONAL THERAPY INITIAL EVALUATION ADULT - PLANNED THERAPY INTERVENTIONS, OT EVAL
Laparoscopic Appendectomy    Patient Name: Vlad King     SURGERY DATE: 24     : 1960     AGE: 63 y.o.     Surgeon: Madhuri Batista DO    Anesthesiologist: Anesthesiologist: Adrian Valentine MD  CRNA: Micki Hernandez, APRN - CRNA; Carmen Mejias, APRN - CRNA     Anesthesia: General     Surgical Assist: Cornelia Celaya    PreOp DX: Acute Appendicitis    PostOp DX: acute on chronic appendicitis with abscess    Procedure: Laparoscopic appendectomy    Implant:   Implant Name Type Inv. Item Serial No.  Lot No. LRB No. Used Action   PATCH LAMINE M DIA2.5IN CIR W/ STRP [employer] ABSRB - [id number]  PATCH LAMINE M DIA2.5IN CIR W/ STRP [employer] ABSRB [id number] BARD DAVOL-WD [id number] Left 1 Implanted        Procedure Details: After informed consent was obtained the patient was taken to the operating room and placed in the supine position.  General anesthesia was administered by the anesthetist to titrate to effect.  The abdomen was prepped and draped in the usual sterile fashion and a timeout procedure was performed.  Next using 11 blade scalpel a 5 mm incision was made inferior to the umbilicus.  A Veress needle was used to establish pneumoperitoneum and a 5 mm trocar was inserted.  The laparoscope was then inserted.  Next under direct visualization 2 additional trochars were placed, one 12 mm in the left mid quadrant and one 5 mm in the epigastric region.  The patient was then placed in reverse Trendelenburg position and rotated to the left.  The cecum was identified.  The base of the appendix was immediately identified and grossly normal in appearance.  Using a [place] dissector a window was created at the base of the appendix.  A blue laparoscopic Endo MONICA load was used to transect the base of the appendix.  We then carefully dissected the remaining appendix and rind that extended superiorly lateral to the cecum. This dissection was performed with a combination of blunt and ligasure 
ADL retraining/ROM/strengthening

## 2024-10-12 ENCOUNTER — APPOINTMENT (OUTPATIENT)
Dept: INTERNAL MEDICINE | Facility: CLINIC | Age: 29
End: 2024-10-12
Payer: COMMERCIAL

## 2024-10-12 VITALS
SYSTOLIC BLOOD PRESSURE: 102 MMHG | HEIGHT: 66 IN | DIASTOLIC BLOOD PRESSURE: 66 MMHG | OXYGEN SATURATION: 95 % | HEART RATE: 83 BPM | BODY MASS INDEX: 18.32 KG/M2 | WEIGHT: 114 LBS

## 2024-10-12 DIAGNOSIS — Z01.84 ENCOUNTER FOR ANTIBODY RESPONSE EXAMINATION: ICD-10-CM

## 2024-10-12 DIAGNOSIS — Z11.1 ENCOUNTER FOR SCREENING FOR RESPIRATORY TUBERCULOSIS: ICD-10-CM

## 2024-10-12 DIAGNOSIS — Z00.00 ENCOUNTER FOR GENERAL ADULT MEDICAL EXAMINATION W/OUT ABNORMAL FINDINGS: ICD-10-CM

## 2024-10-12 PROCEDURE — 36415 COLL VENOUS BLD VENIPUNCTURE: CPT

## 2024-10-12 PROCEDURE — 99395 PREV VISIT EST AGE 18-39: CPT

## 2024-10-12 NOTE — HEALTH RISK ASSESSMENT
[Very Good] : ~his/her~  mood as very good [No] : In the past 12 months have you used drugs other than those required for medical reasons? No [No falls in past year] : Patient reported no falls in the past year [0] : 2) Feeling down, depressed, or hopeless: Not at all (0) [de-identified] : walking [de-identified] : regular [USA6Ykemr] : 0 [Never] : Never [Patient reported PAP Smear was normal] : Patient reported PAP Smear was normal [Employed] : employed [Single] : single [Fully functional (bathing, dressing, toileting, transferring, walking, feeding)] : Fully functional (bathing, dressing, toileting, transferring, walking, feeding) [Fully functional (using the telephone, shopping, preparing meals, housekeeping, doing laundry, using] : Fully functional and needs no help or supervision to perform IADLs (using the telephone, shopping, preparing meals, housekeeping, doing laundry, using transportation, managing medications and managing finances) [Reports changes in hearing] : Reports no changes in hearing [Reports changes in vision] : Reports no changes in vision [Reports changes in dental health] : Reports no changes in dental health [Smoke Detector] : smoke detector [Carbon Monoxide Detector] : carbon monoxide detector [Seat Belt] :  uses seat belt [Sunscreen] : uses sunscreen [TB Exposure] : is being exposed to tuberculosis [PapSmearDate] : 2024 [FreeTextEntry2] : teacher

## 2024-10-12 NOTE — HEALTH RISK ASSESSMENT
[Very Good] : ~his/her~  mood as very good [No] : In the past 12 months have you used drugs other than those required for medical reasons? No [No falls in past year] : Patient reported no falls in the past year [0] : 2) Feeling down, depressed, or hopeless: Not at all (0) [de-identified] : walking [de-identified] : regular [POC4Rtgiv] : 0 [Never] : Never [Patient reported PAP Smear was normal] : Patient reported PAP Smear was normal [Employed] : employed [Single] : single [Fully functional (bathing, dressing, toileting, transferring, walking, feeding)] : Fully functional (bathing, dressing, toileting, transferring, walking, feeding) [Fully functional (using the telephone, shopping, preparing meals, housekeeping, doing laundry, using] : Fully functional and needs no help or supervision to perform IADLs (using the telephone, shopping, preparing meals, housekeeping, doing laundry, using transportation, managing medications and managing finances) [Reports changes in hearing] : Reports no changes in hearing [Reports changes in vision] : Reports no changes in vision [Reports changes in dental health] : Reports no changes in dental health [Smoke Detector] : smoke detector [Carbon Monoxide Detector] : carbon monoxide detector [Seat Belt] :  uses seat belt [Sunscreen] : uses sunscreen [TB Exposure] : is being exposed to tuberculosis [PapSmearDate] : 2024 [FreeTextEntry2] : teacher

## 2024-10-17 LAB
25(OH)D3 SERPL-MCNC: 30.4 NG/ML
ALBUMIN SERPL ELPH-MCNC: 5 G/DL
ALP BLD-CCNC: 46 U/L
ALT SERPL-CCNC: 15 U/L
ANION GAP SERPL CALC-SCNC: 14 MMOL/L
AST SERPL-CCNC: 14 U/L
BASOPHILS # BLD AUTO: 0.01 K/UL
BASOPHILS NFR BLD AUTO: 0.2 %
BILIRUB SERPL-MCNC: 0.4 MG/DL
BUN SERPL-MCNC: 13 MG/DL
CALCIUM SERPL-MCNC: 9.8 MG/DL
CHLORIDE SERPL-SCNC: 105 MMOL/L
CHOLEST SERPL-MCNC: 182 MG/DL
CO2 SERPL-SCNC: 22 MMOL/L
CREAT SERPL-MCNC: 0.7 MG/DL
EGFR: 121 ML/MIN/1.73M2
EOSINOPHIL # BLD AUTO: 0.09 K/UL
EOSINOPHIL NFR BLD AUTO: 1.6 %
GLUCOSE SERPL-MCNC: 83 MG/DL
HCT VFR BLD CALC: 42.7 %
HDLC SERPL-MCNC: 61 MG/DL
HGB BLD-MCNC: 13.6 G/DL
IMM GRANULOCYTES NFR BLD AUTO: 0.2 %
LDLC SERPL CALC-MCNC: 110 MG/DL
LYMPHOCYTES # BLD AUTO: 2.06 K/UL
LYMPHOCYTES NFR BLD AUTO: 37.5 %
M TB IFN-G BLD-IMP: NEGATIVE
MAN DIFF?: NORMAL
MCHC RBC-ENTMCNC: 29.3 PG
MCHC RBC-ENTMCNC: 31.9 GM/DL
MCV RBC AUTO: 92 FL
MONOCYTES # BLD AUTO: 0.51 K/UL
MONOCYTES NFR BLD AUTO: 9.3 %
NEUTROPHILS # BLD AUTO: 2.81 K/UL
NEUTROPHILS NFR BLD AUTO: 51.2 %
NONHDLC SERPL-MCNC: 121 MG/DL
PLATELET # BLD AUTO: 305 K/UL
POTASSIUM SERPL-SCNC: 4.4 MMOL/L
PROT SERPL-MCNC: 7.2 G/DL
QUANTIFERON TB PLUS MITOGEN MINUS NIL: >10 IU/ML
QUANTIFERON TB PLUS NIL: 0.04 IU/ML
QUANTIFERON TB PLUS TB1 MINUS NIL: 0 IU/ML
QUANTIFERON TB PLUS TB2 MINUS NIL: 0 IU/ML
RBC # BLD: 4.64 M/UL
RBC # FLD: 14.2 %
SODIUM SERPL-SCNC: 141 MMOL/L
T3 SERPL-MCNC: 128 NG/DL
T4 SERPL-MCNC: 8.3 UG/DL
TRIGL SERPL-MCNC: 56 MG/DL
TSH SERPL-ACNC: 1.14 UIU/ML
WBC # FLD AUTO: 5.49 K/UL

## 2024-10-18 ENCOUNTER — EMERGENCY (EMERGENCY)
Facility: HOSPITAL | Age: 29
LOS: 1 days | Discharge: HOME | End: 2024-10-18
Attending: EMERGENCY MEDICINE
Payer: COMMERCIAL

## 2024-10-18 VITALS
SYSTOLIC BLOOD PRESSURE: 104 MMHG | DIASTOLIC BLOOD PRESSURE: 73 MMHG | OXYGEN SATURATION: 100 % | HEART RATE: 85 BPM | RESPIRATION RATE: 20 BRPM | TEMPERATURE: 98 F

## 2024-10-18 VITALS
SYSTOLIC BLOOD PRESSURE: 116 MMHG | DIASTOLIC BLOOD PRESSURE: 82 MMHG | HEART RATE: 102 BPM | TEMPERATURE: 99 F | OXYGEN SATURATION: 100 % | RESPIRATION RATE: 20 BRPM

## 2024-10-18 LAB
ALBUMIN SERPL ELPH-MCNC: 5.1 G/DL — HIGH (ref 3.3–5)
ALP SERPL-CCNC: 39 U/L — LOW (ref 40–120)
ALT FLD-CCNC: 17 U/L — SIGNIFICANT CHANGE UP (ref 10–45)
ANION GAP SERPL CALC-SCNC: 13 MMOL/L — SIGNIFICANT CHANGE UP (ref 5–17)
AST SERPL-CCNC: 41 U/L — HIGH (ref 10–40)
BASOPHILS # BLD AUTO: 0.02 K/UL — SIGNIFICANT CHANGE UP (ref 0–0.2)
BASOPHILS NFR BLD AUTO: 0.2 % — SIGNIFICANT CHANGE UP (ref 0–2)
BILIRUB SERPL-MCNC: 0.5 MG/DL — SIGNIFICANT CHANGE UP (ref 0.2–1.2)
BUN SERPL-MCNC: 8 MG/DL — SIGNIFICANT CHANGE UP (ref 7–23)
CALCIUM SERPL-MCNC: 9.9 MG/DL — SIGNIFICANT CHANGE UP (ref 8.4–10.5)
CHLORIDE SERPL-SCNC: 101 MMOL/L — SIGNIFICANT CHANGE UP (ref 96–108)
CO2 SERPL-SCNC: 23 MMOL/L — SIGNIFICANT CHANGE UP (ref 22–31)
CREAT SERPL-MCNC: 0.61 MG/DL — SIGNIFICANT CHANGE UP (ref 0.5–1.3)
EGFR: 126 ML/MIN/1.73M2 — SIGNIFICANT CHANGE UP
EOSINOPHIL # BLD AUTO: 0.06 K/UL — SIGNIFICANT CHANGE UP (ref 0–0.5)
EOSINOPHIL NFR BLD AUTO: 0.6 % — SIGNIFICANT CHANGE UP (ref 0–6)
GLUCOSE SERPL-MCNC: 112 MG/DL — HIGH (ref 70–99)
HCG SERPL-ACNC: <2 MIU/ML — SIGNIFICANT CHANGE UP
HCT VFR BLD CALC: 41.8 % — SIGNIFICANT CHANGE UP (ref 34.5–45)
HGB BLD-MCNC: 13.7 G/DL — SIGNIFICANT CHANGE UP (ref 11.5–15.5)
IMM GRANULOCYTES NFR BLD AUTO: 0.5 % — SIGNIFICANT CHANGE UP (ref 0–0.9)
LYMPHOCYTES # BLD AUTO: 2.79 K/UL — SIGNIFICANT CHANGE UP (ref 1–3.3)
LYMPHOCYTES # BLD AUTO: 26.2 % — SIGNIFICANT CHANGE UP (ref 13–44)
MCHC RBC-ENTMCNC: 28.9 PG — SIGNIFICANT CHANGE UP (ref 27–34)
MCHC RBC-ENTMCNC: 32.8 GM/DL — SIGNIFICANT CHANGE UP (ref 32–36)
MCV RBC AUTO: 88.2 FL — SIGNIFICANT CHANGE UP (ref 80–100)
MONOCYTES # BLD AUTO: 0.95 K/UL — HIGH (ref 0–0.9)
MONOCYTES NFR BLD AUTO: 8.9 % — SIGNIFICANT CHANGE UP (ref 2–14)
NEUTROPHILS # BLD AUTO: 6.77 K/UL — SIGNIFICANT CHANGE UP (ref 1.8–7.4)
NEUTROPHILS NFR BLD AUTO: 63.6 % — SIGNIFICANT CHANGE UP (ref 43–77)
NRBC # BLD: 0 /100 WBCS — SIGNIFICANT CHANGE UP (ref 0–0)
PLATELET # BLD AUTO: 370 K/UL — SIGNIFICANT CHANGE UP (ref 150–400)
POTASSIUM SERPL-MCNC: 6.4 MMOL/L — CRITICAL HIGH (ref 3.5–5.3)
POTASSIUM SERPL-SCNC: 6.4 MMOL/L — CRITICAL HIGH (ref 3.5–5.3)
PROT SERPL-MCNC: 8.4 G/DL — HIGH (ref 6–8.3)
RBC # BLD: 4.74 M/UL — SIGNIFICANT CHANGE UP (ref 3.8–5.2)
RBC # FLD: 13.5 % — SIGNIFICANT CHANGE UP (ref 10.3–14.5)
SODIUM SERPL-SCNC: 137 MMOL/L — SIGNIFICANT CHANGE UP (ref 135–145)
WBC # BLD: 10.64 K/UL — HIGH (ref 3.8–10.5)
WBC # FLD AUTO: 10.64 K/UL — HIGH (ref 3.8–10.5)

## 2024-10-18 PROCEDURE — 99284 EMERGENCY DEPT VISIT MOD MDM: CPT

## 2024-10-18 PROCEDURE — 82962 GLUCOSE BLOOD TEST: CPT

## 2024-10-18 PROCEDURE — 84702 CHORIONIC GONADOTROPIN TEST: CPT

## 2024-10-18 PROCEDURE — 84295 ASSAY OF SERUM SODIUM: CPT

## 2024-10-18 PROCEDURE — 82947 ASSAY GLUCOSE BLOOD QUANT: CPT

## 2024-10-18 PROCEDURE — 82435 ASSAY OF BLOOD CHLORIDE: CPT

## 2024-10-18 PROCEDURE — 82330 ASSAY OF CALCIUM: CPT

## 2024-10-18 PROCEDURE — 84132 ASSAY OF SERUM POTASSIUM: CPT

## 2024-10-18 PROCEDURE — 99285 EMERGENCY DEPT VISIT HI MDM: CPT

## 2024-10-18 PROCEDURE — 86618 LYME DISEASE ANTIBODY: CPT

## 2024-10-18 PROCEDURE — 85018 HEMOGLOBIN: CPT

## 2024-10-18 PROCEDURE — 80053 COMPREHEN METABOLIC PANEL: CPT

## 2024-10-18 PROCEDURE — 85025 COMPLETE CBC W/AUTO DIFF WBC: CPT

## 2024-10-18 PROCEDURE — 82803 BLOOD GASES ANY COMBINATION: CPT

## 2024-10-18 PROCEDURE — 83605 ASSAY OF LACTIC ACID: CPT

## 2024-10-18 PROCEDURE — 85014 HEMATOCRIT: CPT

## 2024-10-18 RX ORDER — PREDNISONE 5 MG/1
3 TABLET ORAL
Qty: 21 | Refills: 0
Start: 2024-10-18 | End: 2024-10-24

## 2024-10-18 RX ORDER — POLYVINYL ALCOHOL 1 %
1 DROPS OPHTHALMIC (EYE) ONCE
Refills: 0 | Status: COMPLETED | OUTPATIENT
Start: 2024-10-18 | End: 2024-10-18

## 2024-10-18 RX ORDER — VALACYCLOVIR 1000 MG/1
1 TABLET ORAL
Qty: 21 | Refills: 0
Start: 2024-10-18 | End: 2024-10-24

## 2024-10-18 RX ORDER — POLYVINYL ALCOHOL 1 %
1 DROPS OPHTHALMIC (EYE)
Qty: 1 | Refills: 0
Start: 2024-10-18 | End: 2024-10-24

## 2024-10-18 RX ORDER — VALACYCLOVIR 1000 MG/1
1000 TABLET ORAL ONCE
Refills: 0 | Status: COMPLETED | OUTPATIENT
Start: 2024-10-18 | End: 2024-10-18

## 2024-10-18 RX ADMIN — Medication 1 DROP(S): at 16:49

## 2024-10-18 RX ADMIN — VALACYCLOVIR 1000 MILLIGRAM(S): 1000 TABLET ORAL at 16:49

## 2024-10-18 NOTE — ED ADULT NURSE NOTE - OBJECTIVE STATEMENT
patient is a 27 y/o F with no pertinent PMH who presents to the ED via triage c/o facial "weakness" since 930 last night. patient states that she went to  and was dx with [place] Palsy and was prescribed steroids, didn't take a dose and presented to ED b/c LUE felt "weird". no weakness or drift noted on exam. patient is a&ox4, PERRL. ambulatory independently. respirations even and unlabored. abdomen soft, nondistended. skin intact. denies fevers/chills, numbness/tingling, headache, dizziness, vision changes, cp, sob, cough, abd pain, n/v/d, dysuria, hematuria, bloody stools, back pain. code stroke activated on patient arrival, code cancelled by MD Loredo and neuro. updated on plan of care. comfort and safety maintained

## 2024-10-18 NOTE — CONSULT NOTE ADULT - SUBJECTIVE AND OBJECTIVE BOX
Neurology - Consult Note    -  Spectra: [phone] ([facility]), [phone] ([facility]). For new consults, please page: [phone] ([facility]), [phone] ([facility]).  -    HPI: Patient ANGELICA ROJAS is a 28y (1995) LEFT-handed woman who presents to [facility] ED on 10/18/2024, as a CODE STROKE, with c/o left facial weakness.    PMH significant for: Denies    Accompanied by her mother. On Wednesday (10/16/2024), she experienced transient tingling on the left side of her face, which resolved. Last night, at 21:30, 10/17/2024, she noted tingling on the left side of her face again. She noticed her left face was not moving properly. She went to bed. Awoke this morning, noted paralysis of the left side of her face. Went to . They diagnosed her with Bell's palsy. Given prescription for steroids (has not started). However, patient says because she had been throwing heavy bags over her shoulder while sitting on the floor - she felt tightness in her left shoulder and pain in her left arm. Came to the ED. CODE STROKE called, but then canceled in d/w ED - complete paralysis of the left side of her face with no other focal deficits - clear CN VII palsy with LMN pattern. Patient denies recent travel, hiking, tick bites, rashes. No history of thrombosis. Denies medical issues. Not taking medications. No early stroke in the family. Denies use of hormones, OCPs, or hormonal IUD.    No AD at baseline. Climbs stairs, drives. Does not smoke or use recreational drugs. Occasional EtOH use. 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    Constipated and tight left neck. Denies F/C, significant HA, diarrhea, dysuria.    Review of Systems:  All other review of systems is negative unless indicated above.    Allergies:  No Known Allergies      PMHx/PSHx/Family Hx: As above, otherwise see below   No pertinent past medical history        Social Hx:  Per HPI    Medications:  MEDICATIONS  (STANDING):    MEDICATIONS  (PRN):      Vitals:  T(C): 37 (10-18-24 @ 14:40), Max: 37 (10-18-24 @ 14:40)  HR: 102 (10-18-24 @ 14:40) (102 - 102)  BP: 116/82 (10-18-24 @ 14:40) (116/82 - 116/82)  RR: 20 (10-18-24 @ 14:40) (20 - 20)  SpO2: 100% (10-18-24 @ 14:40) (100% - 100%)    Physical Examination:  General - Sitting up on ED cart, anxious appearing  Cardiovascular - No LE edema  Eyes - Non-injected conjunctivae, anicteric sclerae    Neurologic Exam:  Mental status:  - Awake, Alert  - Oriented to: person, place, and time  - Speech: fluent  - Repetition and naming: intact   - Follows simple and cross commands     Cranial nerves - PERRL, VFF with finger counting, EOMI - no nystagmus, face sensation (V1-V3) intact b/l, facial strength: NO raise of left eyebrow/incomplete eyelid closure left eye, complete paralysis of the lower face, R face without weakness, hearing grossly intact, palate with symmetric elevation, shoulder shrug intact b/l, tongue midline on protrusion with full lateral movement  Dysarthria: not present    Motor - Normal bulk throughout. No pronator drift.  Strength testing (R/L)  Deltoid:  5/5  Biceps:  5/5        Triceps:  5/5       Wrist Extension:  5/5      Wrist Flexion:  5/5       Interossei:  5/5        :  5/5    Hip Flexion:  5/5  Knee Flexion:  5/5      Knee Extension:  5/5      Dorsiflexion:  5/5      Plantar Flexion:  5/5    Sensation - Light touch intact throughout    DTRs  Deferred d/t focused neurologic exam    Coordination - FNF, HTS intact b/l. No tremors appreciated.    Gait and station - Normal gait, walks unassisted    Labs:                        13.7   10.64 )-----------( 370      ( 18 Oct 2024 15:06 )             41.8           CAPILLARY BLOOD GLUCOSE      POCT Blood Glucose.: 108 mg/dL (18 Oct 2024 14:45)          CSF:                  Radiology:

## 2024-10-18 NOTE — ED PROVIDER NOTE - PATIENT PORTAL LINK FT
You can access the FollowMyHealth Patient Portal offered by [facility] by registering at the following website: [email or web address]. By joining [facility] FollowMyHealth portal, you will also be able to view your health information using other applications (apps) compatible with our system.

## 2024-10-18 NOTE — ED PROVIDER NOTE - NSTIMEPROVIDERCAREINITIATE_GEN_ER
Called patient. Left message to call the office at [phone] at earliest convenience.   18-Oct-2024 14:47

## 2024-10-18 NOTE — CONSULT NOTE ADULT - ASSESSMENT
ANGELICA ROJAS is a 28y LEFT-handed woman who presents to [facility] ED on 10/18/2024, as a CODE STROKE, with c/o left facial weakness.    ED vitals notable for: , /82, RR 20, SpO2 100% on RA. Labs notable for: ABC 10.64, glucose 108. Exam notable for HBS: forehead VI, eye IV, mouth V to VI.    NIHSS on admission: 3  LKN: 21:30, 10/17/2024  pre-MRS: 0    Impression: LEFT CN VII palsy    Recommendations:  [] Already has prednisone prescription from urgent care  [] STRICT EYE PRECAUTIONS - re-iterated to patient and her mother  [] Initial stroke workup in the ED without acute finding  [] Prompt outpatient follow up with Neurology. Outpatient MRI brain can be performed outpatient if appropriate.  1) Neurology at [address], [address] [place] [address], [phone]  2) Dr. Good Bingham: [address], [place] [address], [phone]  3) Dr. Karie Higginbotham: [address], [address], [place] [address], [phone].   [] Stroke education provided - patient and family educated about BEFAST criteria and need to return to ED immediately if any such symptoms present  [] Return to ED or immediately f/u in clinic if symptoms persist, worsen, or recur    NO tenecteplase d/t outside therapeutic window.  NO thrombectomy d/t no identified ELVO (exam not suggestive of LVO).    Patient/plan discussed with stroke fellow, Dr. Slater, under the supervision of attending vascular neurologist, Dr. Sanchez. Recommendations were relayed directly to the ED. Note delayed d/t emergent patient care.

## 2024-10-18 NOTE — ED PROVIDER NOTE - PROGRESS NOTE DETAILS
Amisha Jeffery DO (PGY-2): Patient stable for discharge. Prednisone and Valtrex prescription given as well as artificial tears. Strict return precautions discussed. Stable for discharge.

## 2024-10-18 NOTE — ED PROVIDER NOTE - NSFOLLOWUPINSTRUCTIONS_ED_ALL_ED_FT
You were seen in the Emergency Department for left facial weakness.   You were diagnosed with Bell's Palsy.     Bell's palsy is a condition that can affect anyone at any age, but it's most common in people between the ages of 15 and 60. Treatments for Bell's palsy include:     Oral glucocorticoids  A short-term course of oral steroids is the primary treatment for Bell's palsy. It's recommended to start treatment within three days of the onset of symptoms.     Antiviral medications  In severe cases, antiviral therapy may be combined with glucocorticoids to improve outcomes.     Eye care  If you have trouble closing your eye, you can use lubricating eye drops or an eye patch to keep your eye moist and protect it from injury.     Pain relievers  You can take analgesics like aspirin, acetaminophen, or ibuprofen to relieve pain.     Warm compress  Applying a washcloth soaked in warm water to your face several times a day can help relieve pain.     YOU WERE PRESCRIBED: Valtrex 1g three times daily for 7 days. Prednisone 60mg once daily for 1 week. Lubricating eye drops 4x daily. Lubricating ointment at bedtime.     Follow up with your primary care doctor on Monday.     Follow up with Neurology  [address], [address] [place] [address], [phone]  2) Dr. Good Bingham: [address] [place] [address], [phone]  3) Dr. Karie Higginbotham: [address], [address], [place] [address], [phone].    Return to the Emergency Department for vesicular lesions around your eye/ear, eye pain, hearing loss, fever, numbness/weakness/tingling, change in speech, confusion, worsening headache, or any other concerns.

## 2024-10-18 NOTE — ED PROVIDER NOTE - ATTENDING CONTRIBUTION TO CARE
Dr. Loredo: I have personally performed a face to face bedside history and physical examination of this patient. I have discussed the history, examination, review of systems, assessment and plan of management with the resident. I have reviewed the electronic medical record and amended it to reflect my history, review of systems, physical exam, assessment and plan.    Dr. Loredo: 28F no PMHx noticed since 9:30pm yesterday left facial droop. Pt went to sleep, expecting it to resolve. This morning when droop persisted, pt went to , got diagnosed with [place]' palsy given prednisone. Pt states she also noticed LUE felt "weird" after lifting something heavy, so came in to the ED to r/o CVA. Code stroke called on arrival, assessed by Neuro resident, and after discussion with Neuro resident, code stroke canceled. no personal or fam h/o CVA    Gen: No acute distress  HEENT: Mucous membranes moist, pink conjunctivae, EOMI  CV: RRR, no clubbing/cyanosis/edema  Resp: CTAB  GI: Abdomen soft, NT, ND. Normal BS. No rebound, no guarding  : No CVAT  Neuro: A&O x 3, +left facial droop, unable to close left eye, rest of neuro exam normal.  MSK: No spine or joint tenderness to palpation  Skin: No rashes    Pt with likely Bell's Palsy, will tx with prednisone, valtrex and artifical tears and dc

## 2024-10-18 NOTE — ED PROVIDER NOTE - OBJECTIVE STATEMENT
28F with no pmhx presents with left facial weakness since 9:30pm last night. She went to Urgent Care this morning for symptoms and was diagnosed with Bell's Palsy - given steroids but has not yet taken a dose. Came to ED because she felt "weird" in her left UE - no numbness or weakness - and was concerned for stroke. States 2 days ago she felt tingling in her lip but last night noted the facial droop. No recent illnesses. No significant fhx of CVAs. No recent travel. No tick exposure. No headache, ear pain, eye pain, or changes in hearing.

## 2024-10-18 NOTE — ED ADULT TRIAGE NOTE - CHIEF COMPLAINT QUOTE
left facial tingling and droop onset last nigh 9:30 pm had felt intermittent tingling to left upper lip 2 days ago

## 2024-10-18 NOTE — ED PROVIDER NOTE - CLINICAL SUMMARY MEDICAL DECISION MAKING FREE TEXT BOX
28F otherwise healthy presenting with left facial droop since 9:30pm last night. No other symptoms. No other neuro deficits. Initially code stroke called in triage, neuro evaluated at bedside - both agreed symptoms due to Bell's Palsy and code stroke called off. Patient was prescribed steroids at Urgent care earlier today. No tick exposure or recent illnesses. Plan for basic labs, lyme titer, hcg, steroids, valtrex and d/c.

## 2024-10-18 NOTE — ED PROVIDER NOTE - PHYSICAL EXAMINATION
GEN: Tearful, awake, eyes open spontaneously  EYES: normal conjunctiva, perrl, unable to close left eyelid   ENT: NCAT, MMM, Trachea midline  CHEST/LUNGS: Non-tachypneic, CTAB, bilateral breath sounds  CARDIAC: Non-tachycardic, normal perfusion  ABDOMEN: Soft, NTND, No rebound/guarding  MSK: No edema, no gross deformity of extremities  SKIN: No rashes, no petechiae, no vesicles  NEURO: Left facial droop involving the UMN and LMN, normal coordination, no focal motor or sensory deficits. Ambulatory. No aphasia. 5/5 strength bilateral upper and lower extremities.   PSYCH: Alert, appropriate, cooperative, with capacity and insight

## 2024-10-19 LAB
B BURGDOR C6 AB SER-ACNC: NEGATIVE — SIGNIFICANT CHANGE UP
B BURGDOR IGG+IGM SER-ACNC: 0.33 INDEX — SIGNIFICANT CHANGE UP (ref 0.01–0.9)

## 2024-10-22 ENCOUNTER — APPOINTMENT (OUTPATIENT)
Dept: INTERNAL MEDICINE | Facility: CLINIC | Age: 29
End: 2024-10-22
Payer: COMMERCIAL

## 2024-10-22 DIAGNOSIS — G51.0 BELL'S PALSY: ICD-10-CM

## 2024-10-22 PROCEDURE — 99446 NTRPROF PH1/NTRNET/EHR 5-10: CPT

## 2025-01-29 ENCOUNTER — APPOINTMENT (OUTPATIENT)
Dept: ORTHOPEDIC SURGERY | Facility: CLINIC | Age: 30
End: 2025-01-29
Payer: COMMERCIAL

## 2025-01-29 VITALS — WEIGHT: 117 LBS | HEIGHT: 66 IN | BODY MASS INDEX: 18.8 KG/M2

## 2025-01-29 DIAGNOSIS — S52.571D OTHER INTRAARTICULAR FRACTURE OF LOWER END OF RIGHT RADIUS, SUBSEQUENT ENCOUNTER FOR CLOSED FRACTURE WITH ROUTINE HEALING: ICD-10-CM

## 2025-01-29 PROCEDURE — 73110 X-RAY EXAM OF WRIST: CPT | Mod: RT

## 2025-01-29 PROCEDURE — 99203 OFFICE O/P NEW LOW 30 MIN: CPT

## 2025-01-29 NOTE — DISCUSSION/SUMMARY
[FreeTextEntry1] : 29-year-old female with painful right volar locking plate status post distal radius fracture 5 years prior.  I discussed the need for removal as it could be causing attritional wear to the tendons and this is why she has pain and swelling intermittently.  I told her that I would be happy to perform this however she can also be seen by Dr. Mota who I know would also be happy to perform this procedure also.  I left it up to her however I did recommend that she have this done in a timely manner to prevent possible flexor tendon rupture.  My coordinator will reach out to her to discuss what she has decided.  All questions were answered.
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
## 2025-01-29 NOTE — PHYSICAL EXAM
[de-identified] : Right wrist and hand No swelling or deformity today on exam Tender to palpation over the volar distal radius, can palpate radial plate FDS to all digits intact and FDP FPL intact Wrist range of motion full and painless  sensation intact all fingertips  [de-identified] : X-rays of the right wrist performed today, 1/29/2025 including a elevated joint line view.  Volar locking plate does appear to be distal to the watershed line and slightly prominent which could be causing her clinical symptoms, NO deformity to distal radius with congruent wrist joint.

## 2025-02-03 ENCOUNTER — OUTPATIENT (OUTPATIENT)
Dept: OUTPATIENT SERVICES | Facility: HOSPITAL | Age: 30
LOS: 1 days | End: 2025-02-03
Payer: COMMERCIAL

## 2025-02-03 VITALS
DIASTOLIC BLOOD PRESSURE: 78 MMHG | WEIGHT: 117.07 LBS | OXYGEN SATURATION: 100 % | HEIGHT: 66 IN | TEMPERATURE: 99 F | RESPIRATION RATE: 16 BRPM | SYSTOLIC BLOOD PRESSURE: 116 MMHG | HEART RATE: 80 BPM

## 2025-02-03 DIAGNOSIS — Z98.890 OTHER SPECIFIED POSTPROCEDURAL STATES: Chronic | ICD-10-CM

## 2025-02-03 DIAGNOSIS — S52.571D OTHER INTRAARTICULAR FRACTURE OF LOWER END OF RIGHT RADIUS, SUBSEQUENT ENCOUNTER FOR CLOSED FRACTURE WITH ROUTINE HEALING: ICD-10-CM

## 2025-02-03 DIAGNOSIS — S52.501A UNSPECIFIED FRACTURE OF THE LOWER END OF RIGHT RADIUS, INITIAL ENCOUNTER FOR CLOSED FRACTURE: ICD-10-CM

## 2025-02-03 DIAGNOSIS — Z01.818 ENCOUNTER FOR OTHER PREPROCEDURAL EXAMINATION: ICD-10-CM

## 2025-02-03 LAB
ANION GAP SERPL CALC-SCNC: 12 MMOL/L — SIGNIFICANT CHANGE UP (ref 5–17)
BUN SERPL-MCNC: 12 MG/DL — SIGNIFICANT CHANGE UP (ref 7–23)
CALCIUM SERPL-MCNC: 9.8 MG/DL — SIGNIFICANT CHANGE UP (ref 8.4–10.5)
CHLORIDE SERPL-SCNC: 104 MMOL/L — SIGNIFICANT CHANGE UP (ref 96–108)
CO2 SERPL-SCNC: 23 MMOL/L — SIGNIFICANT CHANGE UP (ref 22–31)
CREAT SERPL-MCNC: 0.7 MG/DL — SIGNIFICANT CHANGE UP (ref 0.5–1.3)
EGFR: 121 ML/MIN/1.73M2 — SIGNIFICANT CHANGE UP
GLUCOSE SERPL-MCNC: 81 MG/DL — SIGNIFICANT CHANGE UP (ref 70–99)
HCT VFR BLD CALC: 38.3 % — SIGNIFICANT CHANGE UP (ref 34.5–45)
HGB BLD-MCNC: 12.7 G/DL — SIGNIFICANT CHANGE UP (ref 11.5–15.5)
MCHC RBC-ENTMCNC: 28.9 PG — SIGNIFICANT CHANGE UP (ref 27–34)
MCHC RBC-ENTMCNC: 33.2 G/DL — SIGNIFICANT CHANGE UP (ref 32–36)
MCV RBC AUTO: 87.2 FL — SIGNIFICANT CHANGE UP (ref 80–100)
NRBC # BLD: 0 /100 WBCS — SIGNIFICANT CHANGE UP (ref 0–0)
NRBC BLD-RTO: 0 /100 WBCS — SIGNIFICANT CHANGE UP (ref 0–0)
PLATELET # BLD AUTO: 283 K/UL — SIGNIFICANT CHANGE UP (ref 150–400)
POTASSIUM SERPL-MCNC: 3.5 MMOL/L — SIGNIFICANT CHANGE UP (ref 3.5–5.3)
POTASSIUM SERPL-SCNC: 3.5 MMOL/L — SIGNIFICANT CHANGE UP (ref 3.5–5.3)
RBC # BLD: 4.39 M/UL — SIGNIFICANT CHANGE UP (ref 3.8–5.2)
RBC # FLD: 13 % — SIGNIFICANT CHANGE UP (ref 10.3–14.5)
SODIUM SERPL-SCNC: 139 MMOL/L — SIGNIFICANT CHANGE UP (ref 135–145)
WBC # BLD: 8.9 K/UL — SIGNIFICANT CHANGE UP (ref 3.8–10.5)
WBC # FLD AUTO: 8.9 K/UL — SIGNIFICANT CHANGE UP (ref 3.8–10.5)

## 2025-02-03 PROCEDURE — 85027 COMPLETE CBC AUTOMATED: CPT

## 2025-02-03 PROCEDURE — G0463: CPT

## 2025-02-03 PROCEDURE — 80048 BASIC METABOLIC PNL TOTAL CA: CPT

## 2025-02-03 NOTE — H&P PST ADULT - NSANTHOSAYNRD_GEN_A_CORE
No. SKIP screening performed.  STOP BANG Legend: 0-2 = LOW Risk; 3-4 = INTERMEDIATE Risk; 5-8 = HIGH Risk
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
## 2025-02-03 NOTE — H&P PST ADULT - PROBLEM SELECTOR PLAN 1
Removal of Right Wrist Volar Locking Plate on 2/6/25.  Pre-op instructions, including Chlorhexidine soap, provided - all questions answered  CBC, BMP done at PST Removal of Right Wrist Volar Locking Plate on 2/6/25.  Pre-op instructions, including Chlorhexidine soap, provided - all questions answered  CBC, BMP done at PST  (Last Potassium value in [place] abnormally high ?hemolysis, repeated today)

## 2025-02-03 NOTE — H&P PST ADULT - ASSESSMENT
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

## 2025-02-06 ENCOUNTER — RESULT REVIEW (OUTPATIENT)
Age: 30
End: 2025-02-06

## 2025-02-06 ENCOUNTER — TRANSCRIPTION ENCOUNTER (OUTPATIENT)
Age: 30
End: 2025-02-06

## 2025-02-06 ENCOUNTER — OUTPATIENT (OUTPATIENT)
Dept: OUTPATIENT SERVICES | Facility: HOSPITAL | Age: 30
LOS: 1 days | End: 2025-02-06
Payer: COMMERCIAL

## 2025-02-06 ENCOUNTER — APPOINTMENT (OUTPATIENT)
Dept: ORTHOPEDIC SURGERY | Facility: HOSPITAL | Age: 30
End: 2025-02-06

## 2025-02-06 VITALS
HEART RATE: 83 BPM | TEMPERATURE: 97 F | SYSTOLIC BLOOD PRESSURE: 102 MMHG | OXYGEN SATURATION: 100 % | DIASTOLIC BLOOD PRESSURE: 65 MMHG | HEIGHT: 66 IN | WEIGHT: 117.07 LBS | RESPIRATION RATE: 16 BRPM

## 2025-02-06 VITALS
RESPIRATION RATE: 16 BRPM | DIASTOLIC BLOOD PRESSURE: 57 MMHG | HEART RATE: 76 BPM | SYSTOLIC BLOOD PRESSURE: 94 MMHG | OXYGEN SATURATION: 100 %

## 2025-02-06 DIAGNOSIS — Z98.890 OTHER SPECIFIED POSTPROCEDURAL STATES: Chronic | ICD-10-CM

## 2025-02-06 DIAGNOSIS — S52.571D OTHER INTRAARTICULAR FRACTURE OF LOWER END OF RIGHT RADIUS, SUBSEQUENT ENCOUNTER FOR CLOSED FRACTURE WITH ROUTINE HEALING: ICD-10-CM

## 2025-02-06 PROBLEM — S52.501A UNSPECIFIED FRACTURE OF THE LOWER END OF RIGHT RADIUS, INITIAL ENCOUNTER FOR CLOSED FRACTURE: Chronic | Status: ACTIVE | Noted: 2025-02-03

## 2025-02-06 PROCEDURE — 20680 REMOVAL OF IMPLANT DEEP: CPT | Mod: RT

## 2025-02-06 PROCEDURE — 88300 SURGICAL PATH GROSS: CPT

## 2025-02-06 PROCEDURE — 76000 FLUOROSCOPY <1 HR PHYS/QHP: CPT

## 2025-02-06 PROCEDURE — 93010 ELECTROCARDIOGRAM REPORT: CPT

## 2025-02-06 PROCEDURE — 88300 SURGICAL PATH GROSS: CPT | Mod: 26

## 2025-02-06 PROCEDURE — 93005 ELECTROCARDIOGRAM TRACING: CPT

## 2025-02-06 RX ORDER — ANTISEPTIC SURGICAL SCRUB 0.04 MG/ML
1 SOLUTION TOPICAL ONCE
Refills: 0 | Status: COMPLETED | OUTPATIENT
Start: 2025-02-06 | End: 2025-02-06

## 2025-02-06 RX ORDER — CEFAZOLIN SODIUM IN 0.9 % NACL 2 G/10 ML
2000 SYRINGE (ML) INTRAVENOUS ONCE
Refills: 0 | Status: COMPLETED | OUTPATIENT
Start: 2025-02-06 | End: 2025-02-06

## 2025-02-06 RX ORDER — LIDOCAINE HYDROCHLORIDE 10 MG/ML
0.2 INJECTION EPIDURAL; INFILTRATION; INTRACAUDAL ONCE
Refills: 0 | Status: COMPLETED | OUTPATIENT
Start: 2025-02-06 | End: 2025-02-06

## 2025-02-06 RX ORDER — FENTANYL CITRATE 50 UG/ML
25 INJECTION INTRAMUSCULAR; INTRAVENOUS
Refills: 0 | Status: DISCONTINUED | OUTPATIENT
Start: 2025-02-06 | End: 2025-02-06

## 2025-02-06 RX ORDER — ONDANSETRON 4 MG/1
4 TABLET, ORALLY DISINTEGRATING ORAL ONCE
Refills: 0 | Status: ACTIVE | OUTPATIENT
Start: 2025-02-06 | End: 2026-01-05

## 2025-02-06 RX ORDER — SODIUM CHLORIDE 9 G/ML
1000 INJECTION, SOLUTION INTRAVENOUS
Refills: 0 | Status: ACTIVE | OUTPATIENT
Start: 2025-02-06 | End: 2026-01-05

## 2025-02-06 RX ADMIN — SODIUM CHLORIDE 100 MILLILITER(S): 9 INJECTION, SOLUTION INTRAVENOUS at 08:30

## 2025-02-06 RX ADMIN — ANTISEPTIC SURGICAL SCRUB 1 APPLICATION(S): 0.04 SOLUTION TOPICAL at 08:30

## 2025-02-06 NOTE — ASU DISCHARGE PLAN (ADULT/PEDIATRIC) - NURSING INSTRUCTIONS
You received IV Tylenol in OR today.  You may take Tylenol, up to 1000mg/q6hr, not to exceed 4000mg/day and alternate with Ibuprofen, up to 400mg/q6hr, not to exceed 2400mg/day. You may take either one every 6 hours, you may alternate these medications so that you take one every 3 hours (e.g., Tylenol at 12pm, Ibuprofen at 3pm, Tylenol at 6pm, Ibuprofen at 9pm, etc...). Follow the maximum doses and administration instructions on the bottles.  OK to take Tylenol/Acetaminophen at / after 4PM______ for pain and every 6 hours after as needed.  OK to take Motrin/Ibuprofen at ( start anytime)_____ for pain and every 6 hours after as needed.

## 2025-02-06 NOTE — ASU DISCHARGE PLAN (ADULT/PEDIATRIC) - FINANCIAL ASSISTANCE
[facility] provides services at a reduced cost to those who are determined to be eligible through [facility]’s financial assistance program. Information regarding [facility]’s financial assistance program can be found by going to [email or web address] or by calling [phone].

## 2025-02-06 NOTE — PRE-ANESTHESIA EVALUATION ADULT - NSANTHOSAYNRD_GEN_A_CORE
complains of pain/discomfort
No. SKIP screening performed.  STOP BANG Legend: 0-2 = LOW Risk; 3-4 = INTERMEDIATE Risk; 5-8 = HIGH Risk

## 2025-02-06 NOTE — ASU DISCHARGE PLAN (ADULT/PEDIATRIC) - CARE PROVIDER_API CALL
Nikki Kauffman  Surgery of the Hand  [address], [address]  [place] [address]  Phone: [phone]  Fax: [phone]  Follow Up Time: 2 weeks

## 2025-02-07 ENCOUNTER — TRANSCRIPTION ENCOUNTER (OUTPATIENT)
Age: 30
End: 2025-02-07

## 2025-02-07 LAB — SURGICAL PATHOLOGY STUDY: SIGNIFICANT CHANGE UP

## 2025-02-19 ENCOUNTER — NON-APPOINTMENT (OUTPATIENT)
Age: 30
End: 2025-02-19

## 2025-02-19 ENCOUNTER — APPOINTMENT (OUTPATIENT)
Dept: ORTHOPEDIC SURGERY | Facility: CLINIC | Age: 30
End: 2025-02-19
Payer: COMMERCIAL

## 2025-02-19 VITALS — BODY MASS INDEX: 18.8 KG/M2 | HEIGHT: 66 IN | WEIGHT: 117 LBS

## 2025-02-19 DIAGNOSIS — S52.571D OTHER INTRAARTICULAR FRACTURE OF LOWER END OF RIGHT RADIUS, SUBSEQUENT ENCOUNTER FOR CLOSED FRACTURE WITH ROUTINE HEALING: ICD-10-CM

## 2025-02-19 PROBLEM — Z86.69 PERSONAL HISTORY OF OTHER DISEASES OF THE NERVOUS SYSTEM AND SENSE ORGANS: Chronic | Status: ACTIVE | Noted: 2025-02-03

## 2025-02-19 PROCEDURE — 99024 POSTOP FOLLOW-UP VISIT: CPT

## 2025-02-19 NOTE — HISTORY OF PRESENT ILLNESS
[Doing Well] : is doing well [de-identified] : 2/6/25 TIARRA Right distal radius VLP   Doing well, minimal pain and reports some swelling postop. Here today with her mom. denies any paresthesias.  [de-identified] : Right hand  skin healed Can make a full fist and has full finger extension FPL weak however present SILT full wrist ROM - same as preop  [de-identified] : -F/u 6 weeks

## 2025-04-01 ENCOUNTER — NON-APPOINTMENT (OUTPATIENT)
Age: 30
End: 2025-04-01

## 2025-04-01 ENCOUNTER — APPOINTMENT (OUTPATIENT)
Dept: ELECTROPHYSIOLOGY | Facility: CLINIC | Age: 30
End: 2025-04-01
Payer: COMMERCIAL

## 2025-04-01 ENCOUNTER — APPOINTMENT (OUTPATIENT)
Dept: ORTHOPEDIC SURGERY | Facility: CLINIC | Age: 30
End: 2025-04-01
Payer: COMMERCIAL

## 2025-04-01 VITALS
DIASTOLIC BLOOD PRESSURE: 76 MMHG | BODY MASS INDEX: 18.88 KG/M2 | OXYGEN SATURATION: 100 % | HEART RATE: 88 BPM | SYSTOLIC BLOOD PRESSURE: 110 MMHG | WEIGHT: 117 LBS

## 2025-04-01 DIAGNOSIS — R00.0 TACHYCARDIA, UNSPECIFIED: ICD-10-CM

## 2025-04-01 DIAGNOSIS — S52.571D OTHER INTRAARTICULAR FRACTURE OF LOWER END OF RIGHT RADIUS, SUBSEQUENT ENCOUNTER FOR CLOSED FRACTURE WITH ROUTINE HEALING: ICD-10-CM

## 2025-04-01 DIAGNOSIS — I49.3 VENTRICULAR PREMATURE DEPOLARIZATION: ICD-10-CM

## 2025-04-01 DIAGNOSIS — Z78.9 OTHER SPECIFIED HEALTH STATUS: ICD-10-CM

## 2025-04-01 DIAGNOSIS — Z82.49 FAMILY HISTORY OF ISCHEMIC HEART DISEASE AND OTHER DISEASES OF THE CIRCULATORY SYSTEM: ICD-10-CM

## 2025-04-01 PROCEDURE — 93000 ELECTROCARDIOGRAM COMPLETE: CPT

## 2025-04-01 PROCEDURE — 99024 POSTOP FOLLOW-UP VISIT: CPT

## 2025-04-01 PROCEDURE — 99205 OFFICE O/P NEW HI 60 MIN: CPT

## 2025-04-01 NOTE — HISTORY OF PRESENT ILLNESS
[Doing Well] : is doing well [de-identified] : 2/6/25 TIARRA Right distal radius VLP  Doing well, no pain.  [de-identified] : Right hand skin healed Can make a full fist and has full finger extension FPL intact SILT full wrist ROM - same as preop.   [de-identified] : -F/u PRN -Seeing cards for arrythmia during procedure, going to undergo cath

## 2025-04-03 PROBLEM — I49.3 PVC (PREMATURE VENTRICULAR CONTRACTION): Status: ACTIVE | Noted: 2025-04-01

## 2025-04-03 NOTE — PHYSICAL EXAM

## 2025-04-03 NOTE — CARDIOLOGY SUMMARY
[de-identified] : today: sinus rhythm with ventricular ectopy, couplets and triplets.  2/25/2025: periods of NSVT (LBI, early transition) [de-identified] : 2/25/2025 VE burden of 26.87% including runs of VT (up to 72 beats) [de-identified] : 2/25/2025 LVEF= 55-60% Normal RV size and function No significant valvular disease
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
## 2025-04-03 NOTE — PHYSICAL EXAM

## 2025-04-03 NOTE — CARDIOLOGY SUMMARY
[de-identified] : today: sinus rhythm with ventricular ectopy, couplets and triplets.  2/25/2025: periods of NSVT (LBI, early transition) [de-identified] : 2/25/2025 VE burden of 26.87% including runs of VT (up to 72 beats) [de-identified] : 2/25/2025 LVEF= 55-60% Normal RV size and function No significant valvular disease

## 2025-04-03 NOTE — DISCUSSION/SUMMARY
[FreeTextEntry1] : 29 year old woman with frequent ventricular ectopy (PVCs, couplets and VT).  Assessment of substrate significant a structurally normal heart  She is scheduled for an MRI.  We discussed a plan for EPS with an aim at ablation.

## 2025-04-16 NOTE — PACU DISCHARGE NOTE - NSPTMEETSDISCHCRITERIADT_GEN_A_CORE
Detail Level: Detailed Quality 226: Preventive Care And Screening: Tobacco Use: Screening And Cessation Intervention: Patient screened for tobacco use and is an ex/non-smoker 20-Aug-2018 13:44

## 2025-05-13 ENCOUNTER — RESULT REVIEW (OUTPATIENT)
Age: 30
End: 2025-05-13

## 2025-05-13 ENCOUNTER — OUTPATIENT (OUTPATIENT)
Dept: OUTPATIENT SERVICES | Facility: HOSPITAL | Age: 30
LOS: 1 days | End: 2025-05-13
Payer: COMMERCIAL

## 2025-05-13 ENCOUNTER — APPOINTMENT (OUTPATIENT)
Dept: MRI IMAGING | Facility: IMAGING CENTER | Age: 30
End: 2025-05-13

## 2025-05-13 DIAGNOSIS — Z00.8 ENCOUNTER FOR OTHER GENERAL EXAMINATION: ICD-10-CM

## 2025-05-13 DIAGNOSIS — Z98.890 OTHER SPECIFIED POSTPROCEDURAL STATES: Chronic | ICD-10-CM

## 2025-05-13 PROCEDURE — 75561 CARDIAC MRI FOR MORPH W/DYE: CPT | Mod: 26

## 2025-05-13 PROCEDURE — A9585: CPT

## 2025-05-13 PROCEDURE — 75565 CARD MRI VELOC FLOW MAPPING: CPT

## 2025-05-13 PROCEDURE — 75565 CARD MRI VELOC FLOW MAPPING: CPT | Mod: 26

## 2025-05-13 PROCEDURE — 75561 CARDIAC MRI FOR MORPH W/DYE: CPT

## 2025-06-02 DIAGNOSIS — Z01.818 ENCOUNTER FOR OTHER PREPROCEDURAL EXAMINATION: ICD-10-CM

## 2025-06-03 LAB
ALBUMIN SERPL ELPH-MCNC: 4.8 G/DL
ALP BLD-CCNC: 54 U/L
ALT SERPL-CCNC: 12 U/L
ANION GAP SERPL CALC-SCNC: 15 MMOL/L
AST SERPL-CCNC: 18 U/L
BILIRUB SERPL-MCNC: 0.4 MG/DL
BUN SERPL-MCNC: 11 MG/DL
CALCIUM SERPL-MCNC: 9.8 MG/DL
CHLORIDE SERPL-SCNC: 100 MMOL/L
CO2 SERPL-SCNC: 24 MMOL/L
CREAT SERPL-MCNC: 0.71 MG/DL
EGFRCR SERPLBLD CKD-EPI 2021: 119 ML/MIN/1.73M2
GLUCOSE SERPL-MCNC: 76 MG/DL
HCT VFR BLD CALC: 40.3 %
HGB BLD-MCNC: 12.9 G/DL
INR PPP: 1.07 RATIO
MCHC RBC-ENTMCNC: 29.6 PG
MCHC RBC-ENTMCNC: 32 G/DL
MCV RBC AUTO: 92.4 FL
PLATELET # BLD AUTO: 318 K/UL
POTASSIUM SERPL-SCNC: 4.4 MMOL/L
PROT SERPL-MCNC: 7.8 G/DL
PT BLD: 12.6 SEC
RBC # BLD: 4.36 M/UL
RBC # FLD: 13.6 %
SODIUM SERPL-SCNC: 139 MMOL/L
WBC # FLD AUTO: 8.02 K/UL

## 2025-06-04 ENCOUNTER — TRANSCRIPTION ENCOUNTER (OUTPATIENT)
Age: 30
End: 2025-06-04

## 2025-06-04 ENCOUNTER — OUTPATIENT (OUTPATIENT)
Dept: OUTPATIENT SERVICES | Facility: HOSPITAL | Age: 30
LOS: 1 days | End: 2025-06-04
Payer: COMMERCIAL

## 2025-06-04 VITALS
OXYGEN SATURATION: 100 % | WEIGHT: 117.07 LBS | RESPIRATION RATE: 20 BRPM | HEIGHT: 66 IN | TEMPERATURE: 98 F | HEART RATE: 111 BPM | DIASTOLIC BLOOD PRESSURE: 64 MMHG | SYSTOLIC BLOOD PRESSURE: 107 MMHG

## 2025-06-04 VITALS — OXYGEN SATURATION: 99 % | HEART RATE: 87 BPM | SYSTOLIC BLOOD PRESSURE: 115 MMHG | DIASTOLIC BLOOD PRESSURE: 57 MMHG

## 2025-06-04 DIAGNOSIS — Z98.890 OTHER SPECIFIED POSTPROCEDURAL STATES: Chronic | ICD-10-CM

## 2025-06-04 DIAGNOSIS — I47.20 VENTRICULAR TACHYCARDIA, UNSPECIFIED: ICD-10-CM

## 2025-06-04 LAB — HCG UR QL: NEGATIVE — SIGNIFICANT CHANGE UP

## 2025-06-04 PROCEDURE — 93010 ELECTROCARDIOGRAM REPORT: CPT

## 2025-06-04 PROCEDURE — C1732: CPT

## 2025-06-04 PROCEDURE — 86900 BLOOD TYPING SEROLOGIC ABO: CPT

## 2025-06-04 PROCEDURE — 81025 URINE PREGNANCY TEST: CPT

## 2025-06-04 PROCEDURE — 86901 BLOOD TYPING SEROLOGIC RH(D): CPT

## 2025-06-04 PROCEDURE — 93010 ELECTROCARDIOGRAM REPORT: CPT | Mod: 77

## 2025-06-04 PROCEDURE — 93623 PRGRMD STIMJ&PACG IV RX NFS: CPT

## 2025-06-04 PROCEDURE — C1894: CPT

## 2025-06-04 PROCEDURE — 93654 COMPRE EP EVAL TX VT: CPT

## 2025-06-04 PROCEDURE — 99152 MOD SED SAME PHYS/QHP 5/>YRS: CPT

## 2025-06-04 PROCEDURE — 93005 ELECTROCARDIOGRAM TRACING: CPT

## 2025-06-04 PROCEDURE — 93623 PRGRMD STIMJ&PACG IV RX NFS: CPT | Mod: 26

## 2025-06-04 PROCEDURE — 86850 RBC ANTIBODY SCREEN: CPT

## 2025-06-04 NOTE — PATIENT PROFILE ADULT - FUNCTIONAL SCREEN CURRENT LEVEL: COMMUNICATION, MLM
[facility]  [address] [place] [id number]  Phone: [phone]  Fax: [phone]  Follow Up Time: 2 weeks  
0 = understands/communicates without difficulty

## 2025-06-04 NOTE — ASU DISCHARGE PLAN (ADULT/PEDIATRIC) - NS MD DC FALL RISK RISK
For information on Fall & Injury Prevention, visit: [email or web address] OR  [email or web address] OR  https://www.cdc.gov/steadi/patient.html

## 2025-06-04 NOTE — ASU DISCHARGE PLAN (ADULT/PEDIATRIC) - FINANCIAL ASSISTANCE
[facility] provides services at a reduced cost to those who are determined to be eligible through [facility]’s financial assistance program. Information regarding [facility]’s financial assistance program can be found by going to [email or web address] or by calling [phone].

## 2025-06-05 ENCOUNTER — NON-APPOINTMENT (OUTPATIENT)
Age: 30
End: 2025-06-05

## 2025-07-22 ENCOUNTER — APPOINTMENT (OUTPATIENT)
Dept: ELECTROPHYSIOLOGY | Facility: CLINIC | Age: 30
End: 2025-07-22
Payer: COMMERCIAL

## 2025-07-22 ENCOUNTER — NON-APPOINTMENT (OUTPATIENT)
Age: 30
End: 2025-07-22

## 2025-07-22 VITALS
BODY MASS INDEX: 18.88 KG/M2 | HEART RATE: 76 BPM | DIASTOLIC BLOOD PRESSURE: 79 MMHG | SYSTOLIC BLOOD PRESSURE: 118 MMHG | WEIGHT: 117 LBS | OXYGEN SATURATION: 100 %

## 2025-07-22 DIAGNOSIS — I49.3 VENTRICULAR PREMATURE DEPOLARIZATION: ICD-10-CM

## 2025-07-22 PROCEDURE — 99213 OFFICE O/P EST LOW 20 MIN: CPT

## 2025-07-22 PROCEDURE — 93000 ELECTROCARDIOGRAM COMPLETE: CPT

## 2025-07-24 NOTE — CARDIOLOGY SUMMARY
[de-identified] : today: sinus rhythm with ventricular ectopy, couplets and triplets.  2/25/2025: periods of NSVT (LBI, early transition) [de-identified] : 2/25/2025 VE burden of 26.87% including runs of VT (up to 72 beats) [de-identified] : 2/25/2025 LVEF= 55-60% Normal RV size and function No significant valvular disease [de-identified] : 6/4/2025 PVC mapped to the posterior RVOT (near the his bundle region with complete suppression with RF.

## 2025-07-24 NOTE — HISTORY OF PRESENT ILLNESS
[FreeTextEntry1] : 29 year old woman with frequent ventricular ectopy (PVCs, couplets and VT).  On 6/4/2025 she underwent EP testing and ablation (PVC mapped to the posterior RVOT (near the his bundle region)).   No complaints' The groin is well healed.  No chest pain. No shortness of breath.

## 2025-07-24 NOTE — CARDIOLOGY SUMMARY
[de-identified] : today: sinus rhythm with ventricular ectopy, couplets and triplets.  2/25/2025: periods of NSVT (LBI, early transition) [de-identified] : 2/25/2025 VE burden of 26.87% including runs of VT (up to 72 beats) [de-identified] : 2/25/2025 LVEF= 55-60% Normal RV size and function No significant valvular disease [de-identified] : 6/4/2025 PVC mapped to the posterior RVOT (near the his bundle region with complete suppression with RF.

## 2025-07-24 NOTE — PHYSICAL EXAM

## 2025-07-24 NOTE — HISTORY OF PRESENT ILLNESS
[FreeTextEntry1] : 29 year old woman with frequent ventricular ectopy (PVCs, couplets and VT).  On 6/4/2025 she underwent EP testing and ablation (PVC mapped to the posterior RVOT (near the his bundle region)).   No complaints' The groin is well healed.  No chest pain. No shortness of breath.   no

## 2025-07-24 NOTE — DISCUSSION/SUMMARY
[FreeTextEntry1] : 29 year old woman with frequent ventricular ectopy (PVCs, couplets and VT).  On 6/4/2025 she underwent EP testing and ablation (PVC mapped to the posterior RVOT (near the his bundle region)).   doing well post albaiton.  f/u with dr Cherry  [EKG obtained to assist in diagnosis and management of assessed problem(s)] : EKG obtained to assist in diagnosis and management of assessed problem(s)

## 2025-07-24 NOTE — PHYSICAL EXAM

## 2025-07-24 NOTE — PHYSICAL EXAM

## 2025-07-24 NOTE — CARDIOLOGY SUMMARY
[de-identified] : today: sinus rhythm with ventricular ectopy, couplets and triplets.  2/25/2025: periods of NSVT (LBI, early transition) [de-identified] : 2/25/2025 VE burden of 26.87% including runs of VT (up to 72 beats) [de-identified] : 2/25/2025 LVEF= 55-60% Normal RV size and function No significant valvular disease [de-identified] : 6/4/2025 PVC mapped to the posterior RVOT (near the his bundle region with complete suppression with RF.

## (undated) DEVICE — DRSG COBAN 2" LF STERILE

## (undated) DEVICE — DRAPE TOWEL BLUE 17" X 24"

## (undated) DEVICE — TOURNIQUET CUFF 18" DUAL PORT DUAL BLADDER W PLC (BLACK)

## (undated) DEVICE — VENODYNE/SCD SLEEVE CALF MEDIUM

## (undated) DEVICE — MEDICATION LABELS W MARKER

## (undated) DEVICE — GLV 7 PROTEXIS (WHITE)

## (undated) DEVICE — PACK HAND

## (undated) DEVICE — DRSG STERISTRIPS 0.5 X 4"

## (undated) DEVICE — SUT MONOCRYL 3-0 27" PS-2 UNDYED

## (undated) DEVICE — TOURNIQUET CUFF 12" DUAL PORT LUER LOCK

## (undated) DEVICE — DRSG DERMABOND 0.7ML

## (undated) DEVICE — SOL IRR POUR NS 0.9% 500ML

## (undated) DEVICE — DRAPE C ARM MINI PACK FOR 6800